# Patient Record
Sex: MALE | Race: BLACK OR AFRICAN AMERICAN | NOT HISPANIC OR LATINO | URBAN - METROPOLITAN AREA
[De-identification: names, ages, dates, MRNs, and addresses within clinical notes are randomized per-mention and may not be internally consistent; named-entity substitution may affect disease eponyms.]

---

## 2018-01-22 ENCOUNTER — INPATIENT (INPATIENT)
Facility: HOSPITAL | Age: 23
LOS: 1 days | Discharge: HOME | DRG: 203 | End: 2018-01-24
Attending: INTERNAL MEDICINE | Admitting: INTERNAL MEDICINE
Payer: COMMERCIAL

## 2018-01-22 VITALS
SYSTOLIC BLOOD PRESSURE: 120 MMHG | HEIGHT: 70 IN | OXYGEN SATURATION: 100 % | WEIGHT: 160.06 LBS | DIASTOLIC BLOOD PRESSURE: 58 MMHG | RESPIRATION RATE: 28 BRPM | TEMPERATURE: 98 F | HEART RATE: 122 BPM

## 2018-01-22 DIAGNOSIS — Z90.89 ACQUIRED ABSENCE OF OTHER ORGANS: Chronic | ICD-10-CM

## 2018-01-22 DIAGNOSIS — Z98.890 OTHER SPECIFIED POSTPROCEDURAL STATES: Chronic | ICD-10-CM

## 2018-01-22 DIAGNOSIS — J45.21 MILD INTERMITTENT ASTHMA WITH (ACUTE) EXACERBATION: ICD-10-CM

## 2018-01-22 DIAGNOSIS — J45.902 UNSPECIFIED ASTHMA WITH STATUS ASTHMATICUS: ICD-10-CM

## 2018-01-22 LAB
ALBUMIN SERPL ELPH-MCNC: 4.9 G/DL — SIGNIFICANT CHANGE UP (ref 3.3–5.2)
ALP SERPL-CCNC: 115 U/L — SIGNIFICANT CHANGE UP (ref 40–120)
ALT FLD-CCNC: 17 U/L — SIGNIFICANT CHANGE UP
ANION GAP SERPL CALC-SCNC: 20 MMOL/L — HIGH (ref 5–17)
AST SERPL-CCNC: 34 U/L — SIGNIFICANT CHANGE UP
BASOPHILS # BLD AUTO: 0 K/UL — SIGNIFICANT CHANGE UP (ref 0–0.2)
BILIRUB SERPL-MCNC: 0.9 MG/DL — SIGNIFICANT CHANGE UP (ref 0.4–2)
BUN SERPL-MCNC: 12 MG/DL — SIGNIFICANT CHANGE UP (ref 8–20)
CALCIUM SERPL-MCNC: 9.8 MG/DL — SIGNIFICANT CHANGE UP (ref 8.6–10.2)
CHLORIDE SERPL-SCNC: 100 MMOL/L — SIGNIFICANT CHANGE UP (ref 98–107)
CO2 SERPL-SCNC: 24 MMOL/L — SIGNIFICANT CHANGE UP (ref 22–29)
CREAT SERPL-MCNC: 0.81 MG/DL — SIGNIFICANT CHANGE UP (ref 0.5–1.3)
EOSINOPHIL # BLD AUTO: 1.1 K/UL — HIGH (ref 0–0.5)
EOSINOPHIL NFR BLD AUTO: 6 % — SIGNIFICANT CHANGE UP (ref 0–6)
GLUCOSE SERPL-MCNC: 130 MG/DL — HIGH (ref 70–115)
HCT VFR BLD CALC: 47.7 % — SIGNIFICANT CHANGE UP (ref 42–52)
HGB BLD-MCNC: 16.8 G/DL — SIGNIFICANT CHANGE UP (ref 14–18)
LYMPHOCYTES # BLD AUTO: 17 % — LOW (ref 20–55)
LYMPHOCYTES # BLD AUTO: 2.4 K/UL — SIGNIFICANT CHANGE UP (ref 1–4.8)
MCHC RBC-ENTMCNC: 28.2 PG — SIGNIFICANT CHANGE UP (ref 27–31)
MCHC RBC-ENTMCNC: 35.2 G/DL — SIGNIFICANT CHANGE UP (ref 32–36)
MCV RBC AUTO: 80.2 FL — SIGNIFICANT CHANGE UP (ref 80–94)
MONOCYTES # BLD AUTO: 1.2 K/UL — HIGH (ref 0–0.8)
MONOCYTES NFR BLD AUTO: 9 % — SIGNIFICANT CHANGE UP (ref 3–10)
NEUTROPHILS # BLD AUTO: 9.5 K/UL — HIGH (ref 1.8–8)
NEUTROPHILS NFR BLD AUTO: 66 % — SIGNIFICANT CHANGE UP (ref 37–73)
PLAT MORPH BLD: NORMAL — SIGNIFICANT CHANGE UP
PLATELET # BLD AUTO: 273 K/UL — SIGNIFICANT CHANGE UP (ref 150–400)
POTASSIUM SERPL-MCNC: 3.7 MMOL/L — SIGNIFICANT CHANGE UP (ref 3.5–5.3)
POTASSIUM SERPL-SCNC: 3.7 MMOL/L — SIGNIFICANT CHANGE UP (ref 3.5–5.3)
PROT SERPL-MCNC: 8.4 G/DL — SIGNIFICANT CHANGE UP (ref 6.6–8.7)
RBC # BLD: 5.95 M/UL — SIGNIFICANT CHANGE UP (ref 4.6–6.2)
RBC # FLD: 13.2 % — SIGNIFICANT CHANGE UP (ref 11–15.6)
RBC BLD AUTO: NORMAL — SIGNIFICANT CHANGE UP
SODIUM SERPL-SCNC: 144 MMOL/L — SIGNIFICANT CHANGE UP (ref 135–145)
VARIANT LYMPHS # BLD: 2 % — SIGNIFICANT CHANGE UP (ref 0–6)
WBC # BLD: 14.3 K/UL — HIGH (ref 4.8–10.8)
WBC # FLD AUTO: 14.3 K/UL — HIGH (ref 4.8–10.8)

## 2018-01-22 PROCEDURE — 99223 1ST HOSP IP/OBS HIGH 75: CPT | Mod: GC

## 2018-01-22 PROCEDURE — 71045 X-RAY EXAM CHEST 1 VIEW: CPT | Mod: 26

## 2018-01-22 PROCEDURE — 99285 EMERGENCY DEPT VISIT HI MDM: CPT

## 2018-01-22 RX ORDER — IPRATROPIUM/ALBUTEROL SULFATE 18-103MCG
3 AEROSOL WITH ADAPTER (GRAM) INHALATION EVERY 6 HOURS
Qty: 0 | Refills: 0 | Status: DISCONTINUED | OUTPATIENT
Start: 2018-01-22 | End: 2018-01-23

## 2018-01-22 RX ORDER — IPRATROPIUM/ALBUTEROL SULFATE 18-103MCG
3 AEROSOL WITH ADAPTER (GRAM) INHALATION
Qty: 0 | Refills: 0 | Status: COMPLETED | OUTPATIENT
Start: 2018-01-22 | End: 2018-01-22

## 2018-01-22 RX ORDER — ALBUTEROL 90 UG/1
2.5 AEROSOL, METERED ORAL EVERY 4 HOURS
Qty: 0 | Refills: 0 | Status: DISCONTINUED | OUTPATIENT
Start: 2018-01-22 | End: 2018-01-23

## 2018-01-22 RX ORDER — EPINEPHRINE 0.3 MG/.3ML
0.3 INJECTION INTRAMUSCULAR; SUBCUTANEOUS ONCE
Qty: 0 | Refills: 0 | Status: COMPLETED | OUTPATIENT
Start: 2018-01-22 | End: 2018-01-22

## 2018-01-22 RX ORDER — SODIUM CHLORIDE 9 MG/ML
3 INJECTION INTRAMUSCULAR; INTRAVENOUS; SUBCUTANEOUS EVERY 8 HOURS
Qty: 0 | Refills: 0 | Status: DISCONTINUED | OUTPATIENT
Start: 2018-01-22 | End: 2018-01-24

## 2018-01-22 RX ADMIN — EPINEPHRINE 0.3 MILLIGRAM(S): 0.3 INJECTION INTRAMUSCULAR; SUBCUTANEOUS at 12:06

## 2018-01-22 RX ADMIN — Medication 3 MILLILITER(S): at 12:21

## 2018-01-22 RX ADMIN — Medication 3 MILLILITER(S): at 14:00

## 2018-01-22 RX ADMIN — SODIUM CHLORIDE 3 MILLILITER(S): 9 INJECTION INTRAMUSCULAR; INTRAVENOUS; SUBCUTANEOUS at 20:50

## 2018-01-22 RX ADMIN — Medication 3 MILLILITER(S): at 13:12

## 2018-01-22 RX ADMIN — Medication 104 MILLIGRAM(S): at 20:08

## 2018-01-22 RX ADMIN — Medication 3 MILLILITER(S): at 20:08

## 2018-01-22 NOTE — H&P ADULT - NSHPSOCIALHISTORY_GEN_ALL_CORE
Patient is in between jobs.  He is , lives with his wife and one son. His son is sick with URI  He is a current smoker of cigarettes since age 14, approximately 5 cigarettes/day, he is thinking about quitting, not ready to quit yet.  Smokes marijuana approximately 2 times a month.  Drinks dark liquor, approximately once a week, 4-5 drinks.  No carpeting at home, no pets.

## 2018-01-22 NOTE — ED ADULT NURSE NOTE - OBJECTIVE STATEMENT
patient complaining of asthma exacerbation that started a week ago and has gotten progressively worse. Resp even inspiratory and expiratory wheezing noted. No productive cough. patient states he recently lost his job and he was depressed, has a son who helped him out of this depression.

## 2018-01-22 NOTE — ED ADULT TRIAGE NOTE - BP NONINVASIVE DIASTOLIC (MM HG)
Daughter calling for rx  Was not aware 48-72hr turnaround  Meagan Elliott pt needs today is in pain- see other 2/23 encounter 58

## 2018-01-22 NOTE — H&P ADULT - PROBLEM SELECTOR PLAN 1
-Admit to medical floor.  -Continuous pulse oximeter and supplemental O2 by nasal cannulae if Sat <92%  -IV Methylprednisolone at 2mg/Kg x1 then 1mg/Kg IVq6h.  -Duonebs q6h and Albuterol q4 hours PRN.  -RVP  -Pulmonary consult.  -Counseling about smoking cessation, will provide Nicotine patch if needed.

## 2018-01-22 NOTE — ED PROVIDER NOTE - OBJECTIVE STATEMENT
21 y/o M pt transported to ED c/o SOB and wheezing via EMS with hx of RAD. Per EMS pt was administered albuterol treatments x2, solumedrol, magnesium and epi PTA. He reports that he has been sick the last 2 weeks but the sxs progrssed over the last few days. No further complaints at this time.

## 2018-01-22 NOTE — H&P ADULT - ASSESSMENT
22 years old male patient with PMHx of asthma presenting today with progressively worsening shortness of breath, productive cough and chest pain. Given his medical history and sick contacts, likely asthma exacerbation secondary to URI.

## 2018-01-22 NOTE — H&P ADULT - NSHPLABSRESULTS_GEN_ALL_CORE
16.8   14.3  )-----------( 273      ( 22 Jan 2018 13:26 )             47.7   01-22    144  |  100  |  12.0  ----------------------------<  130<H>  3.7   |  24.0  |  0.81    Ca    9.8      22 Jan 2018 13:26  TPro  8.4  /  Alb  4.9  /  TBili  0.9  /  DBili  x   /  AST  34  /  ALT  17  /  AlkPhos  115  01-22    < from: Xray Chest 1 View AP-PORTABLE IMMEDIATE (01.22.18 @ 13:08) >  Findings:  The lungs are clear. The heart and mediastinum are unremarkable.  No hilar abnormality is seen.  There is no evidence of pleural effusion. The visualized osseous structures demonstrate no abnormality.    Impression:  Unremarkable exam.    MATTHEW PRIETO M.D., ATTENDING RADIOLOGIST  < end of copied text >    < from: 12 Lead ECG (01.22.18 @ 13:33) >  Ventricular Rate 104 BPM  Atrial Rate 104 BPM  P-R Interval 128 ms  QRS Duration 74 ms  Q-T Interval 376 ms  QTC Calculation(Bezet) 494 ms  P Axis 78 degrees  R Axis 74 degrees  T Axis 35 degrees    Diagnosis Line Sinus tachycardia  Right atrial enlargement  Borderline ECG  Confirmed by GENEVA MATHEWS ([id number]) on 1/22/2018 4:39:08 PM  < end of copied text >

## 2018-01-22 NOTE — SBIRT NOTE. - NSSBIRTSERVICES_GEN_A_ED_FT
Provided SBIRT services: Full screen positive. Brief Intervention Performed. Screening results were reviewed with the patient and patient was provided information about healthy guidelines and potential negative consequences associated with level of risk. Motivation and readiness to reduce or stop use was discussed and goals and activities to make changes were suggested/offered.  Audit Score: 6  DAST Score: 2  Duration = 20 Minutes

## 2018-01-22 NOTE — H&P ADULT - HISTORY OF PRESENT ILLNESS
Patient is a 22 years old male with prior medical history of asthma who was brought in by ambulance to the ED of this establishment with approximately 1 week of cough productive of yellow sputum, dyspnea that progressed until being present at rest, chest tightness and substernal sharp pain 7/10 at its worst associated with coughing for which he was administering nebulized albuterol at home without improvement. EMS administered x2 nebulizations with Salbutamol, IV Methylprednisolone and Magnesium and Epinephrine. Upon arrival at the ED, as per ED physician, patient was found tachycardic in the 122bpm, tachypneic at 28rpm and in moderate respiratory distress with use of accessory muscles, he received Duonebs q20 minutes x3 and one dose of 0.3 mg of epinephrine IM after which he reports improvement of his symptoms.   As per patient he uses nebulized albuterol PRN, no use of inhaled corticosteroids or other medications, he reports asthma symptoms < once a month, nighttime symptoms approximately once a month, his last hospitalization due to asthma was 5 years ago, and he has had 2 visits to the ED due to asthma in the last 12 months, he states that at age 2 he was admitted to the ICU and intubated due to asthma as well.

## 2018-01-22 NOTE — ED ADULT NURSE REASSESSMENT NOTE - GENERAL PATIENT STATE
cooperative/comfortable appearance/resting/sleeping
improvement verbalized/smiling/interactive/comfortable appearance/cooperative

## 2018-01-22 NOTE — ED ADULT NURSE REASSESSMENT NOTE - NS ED NURSE REASSESS COMMENT FT1
Pt A&OX3, resting comfortably, watching TV.  Pt states he feels much better than when he came into ED.  Pt still wheezing bilat.  Pt states he still feels chest tightness no pain.
Pt still wheezing bilat.  Pt states he feels better than when he came into ED.  NAD.  Pt was sleeping, easily arousable.  Pt still c/o of chest discomfort when he takes a deep breath and coughs.
Assumed pt care @ 1930 from CHANDNI Vance. Pt is A&Ox3 in NAD, SOB with exertion. Pt denies complaint.  IV clean dry and intact, flushes without difficulty. Pt OOB independently. Safety maintained, Bed locked in lowest position. Pt aware of the plan of care. Pt awaiting bed placement. Will continue to monitor.

## 2018-01-22 NOTE — H&P ADULT - NSHPPHYSICALEXAM_GEN_ALL_CORE
Vital Signs Last 24 Hrs  T(C): 36.7 (22 Jan 2018 11:49), Max: 36.7 (22 Jan 2018 11:49)  T(F): 98.1 (22 Jan 2018 11:49), Max: 98.1 (22 Jan 2018 11:49)  HR: 122 (22 Jan 2018 11:49) (122 - 122)  BP: 120/58 (22 Jan 2018 11:49) (120/58 - 120/58)  RR: 28 (22 Jan 2018 11:49) (28 - 28)  SpO2: 100% (22 Jan 2018 11:49) (100% - 100%)    General: Patient found resting in bed, in no acute distress.  HEENT: Head is normocephalic, atraumatic, Eyes: EOMI, clear conjunctivae and sclerae, Nose: No nasal discharge. Ears: there is impacted cerumen partially obstructing ear canal bilaterally, tympanic membrane is pearly bilaterally, no bulging, no perforation. Throat: Absence of tonsils is noted, no pharyngeal erythema or exudates.  Neck: Supple  Respiratory: Tachypneic, no use of accessory muscles, inspiratory and expiratory diffuse bilateral wheezing, no crackles, moderate air movement.  Cardiac: Regular rate and rhythm, soft S1 and S2, no murmurs, rubs or gallops.  GI: Bowel sounds present in all quadrants, abdomen is flat, soft, nontender, nondistended.  Extremities: No cyanosis or edema, capillary refill <2 seconds.  Neuro: Alert and oriented x3, no gross deficits.  Psych: Good eye contact, normal speech and affect.

## 2018-01-22 NOTE — ED ADULT TRIAGE NOTE - CHIEF COMPLAINT QUOTE
pt BIBA from clinic for asthma attack. pt AOX3, speaking in full sentences. 20g to right forearm, given epi, mag and solumedrol by  EMS. total 2 albuterol given PTA. states feeling SOB x few days. pt tachypneic with labored breathing on arrival. ER MD at bedside for eval. history of intubation.

## 2018-01-23 ENCOUNTER — TRANSCRIPTION ENCOUNTER (OUTPATIENT)
Age: 23
End: 2018-01-23

## 2018-01-23 LAB
ANION GAP SERPL CALC-SCNC: 17 MMOL/L — SIGNIFICANT CHANGE UP (ref 5–17)
BASOPHILS # BLD AUTO: 0 K/UL — SIGNIFICANT CHANGE UP (ref 0–0.2)
BASOPHILS NFR BLD AUTO: 0.1 % — SIGNIFICANT CHANGE UP (ref 0–2)
BUN SERPL-MCNC: 15 MG/DL — SIGNIFICANT CHANGE UP (ref 8–20)
CALCIUM SERPL-MCNC: 9.8 MG/DL — SIGNIFICANT CHANGE UP (ref 8.6–10.2)
CHLORIDE SERPL-SCNC: 100 MMOL/L — SIGNIFICANT CHANGE UP (ref 98–107)
CO2 SERPL-SCNC: 21 MMOL/L — LOW (ref 22–29)
CREAT SERPL-MCNC: 0.86 MG/DL — SIGNIFICANT CHANGE UP (ref 0.5–1.3)
EOSINOPHIL # BLD AUTO: 0 K/UL — SIGNIFICANT CHANGE UP (ref 0–0.5)
EOSINOPHIL NFR BLD AUTO: 0.1 % — SIGNIFICANT CHANGE UP (ref 0–5)
GLUCOSE SERPL-MCNC: 136 MG/DL — HIGH (ref 70–115)
GRAM STN FLD: SIGNIFICANT CHANGE UP
HCT VFR BLD CALC: 44.4 % — SIGNIFICANT CHANGE UP (ref 42–52)
HGB BLD-MCNC: 15.8 G/DL — SIGNIFICANT CHANGE UP (ref 14–18)
LYMPHOCYTES # BLD AUTO: 1.6 K/UL — SIGNIFICANT CHANGE UP (ref 1–4.8)
LYMPHOCYTES # BLD AUTO: 10.3 % — LOW (ref 20–55)
MCHC RBC-ENTMCNC: 28.1 PG — SIGNIFICANT CHANGE UP (ref 27–31)
MCHC RBC-ENTMCNC: 35.6 G/DL — SIGNIFICANT CHANGE UP (ref 32–36)
MCV RBC AUTO: 79 FL — LOW (ref 80–94)
MONOCYTES # BLD AUTO: 1.1 K/UL — HIGH (ref 0–0.8)
MONOCYTES NFR BLD AUTO: 7.2 % — SIGNIFICANT CHANGE UP (ref 3–10)
NEUTROPHILS # BLD AUTO: 12.5 K/UL — HIGH (ref 1.8–8)
NEUTROPHILS NFR BLD AUTO: 82.1 % — HIGH (ref 37–73)
PLATELET # BLD AUTO: 241 K/UL — SIGNIFICANT CHANGE UP (ref 150–400)
POTASSIUM SERPL-MCNC: 4.1 MMOL/L — SIGNIFICANT CHANGE UP (ref 3.5–5.3)
POTASSIUM SERPL-SCNC: 4.1 MMOL/L — SIGNIFICANT CHANGE UP (ref 3.5–5.3)
RAPID RVP RESULT: SIGNIFICANT CHANGE UP
RBC # BLD: 5.62 M/UL — SIGNIFICANT CHANGE UP (ref 4.6–6.2)
RBC # FLD: 13.3 % — SIGNIFICANT CHANGE UP (ref 11–15.6)
SODIUM SERPL-SCNC: 138 MMOL/L — SIGNIFICANT CHANGE UP (ref 135–145)
SPECIMEN SOURCE: SIGNIFICANT CHANGE UP
WBC # BLD: 15.2 K/UL — HIGH (ref 4.8–10.8)
WBC # FLD AUTO: 15.2 K/UL — HIGH (ref 4.8–10.8)

## 2018-01-23 PROCEDURE — 99223 1ST HOSP IP/OBS HIGH 75: CPT

## 2018-01-23 PROCEDURE — 99233 SBSQ HOSP IP/OBS HIGH 50: CPT | Mod: GC

## 2018-01-23 RX ORDER — ALBUTEROL 90 UG/1
2 AEROSOL, METERED ORAL
Qty: 1 | Refills: 0 | OUTPATIENT
Start: 2018-01-23 | End: 2018-02-21

## 2018-01-23 RX ORDER — FLUTICASONE PROPIONATE 50 MCG
1 SPRAY, SUSPENSION NASAL
Qty: 0 | Refills: 0 | COMMUNITY
Start: 2018-01-23

## 2018-01-23 RX ORDER — ALBUTEROL 90 UG/1
2.5 AEROSOL, METERED ORAL
Qty: 0 | Refills: 0 | Status: DISCONTINUED | OUTPATIENT
Start: 2018-01-23 | End: 2018-01-23

## 2018-01-23 RX ORDER — IPRATROPIUM/ALBUTEROL SULFATE 18-103MCG
3 AEROSOL WITH ADAPTER (GRAM) INHALATION EVERY 4 HOURS
Qty: 0 | Refills: 0 | Status: DISCONTINUED | OUTPATIENT
Start: 2018-01-23 | End: 2018-01-23

## 2018-01-23 RX ORDER — IPRATROPIUM/ALBUTEROL SULFATE 18-103MCG
3 AEROSOL WITH ADAPTER (GRAM) INHALATION EVERY 6 HOURS
Qty: 0 | Refills: 0 | Status: DISCONTINUED | OUTPATIENT
Start: 2018-01-23 | End: 2018-01-24

## 2018-01-23 RX ORDER — ALBUTEROL 90 UG/1
2.5 AEROSOL, METERED ORAL EVERY 4 HOURS
Qty: 0 | Refills: 0 | Status: DISCONTINUED | OUTPATIENT
Start: 2018-01-23 | End: 2018-01-24

## 2018-01-23 RX ORDER — FLUTICASONE PROPIONATE 50 MCG
1 SPRAY, SUSPENSION NASAL
Qty: 0 | Refills: 0 | Status: DISCONTINUED | OUTPATIENT
Start: 2018-01-23 | End: 2018-01-24

## 2018-01-23 RX ADMIN — Medication 3 MILLILITER(S): at 07:24

## 2018-01-23 RX ADMIN — Medication 102 MILLIGRAM(S): at 06:53

## 2018-01-23 RX ADMIN — Medication 40 MILLIGRAM(S): at 18:08

## 2018-01-23 RX ADMIN — Medication 3 MILLILITER(S): at 07:30

## 2018-01-23 RX ADMIN — Medication 102 MILLIGRAM(S): at 00:13

## 2018-01-23 RX ADMIN — Medication 3 MILLILITER(S): at 20:36

## 2018-01-23 RX ADMIN — SODIUM CHLORIDE 3 MILLILITER(S): 9 INJECTION INTRAMUSCULAR; INTRAVENOUS; SUBCUTANEOUS at 06:52

## 2018-01-23 RX ADMIN — SODIUM CHLORIDE 3 MILLILITER(S): 9 INJECTION INTRAMUSCULAR; INTRAVENOUS; SUBCUTANEOUS at 13:44

## 2018-01-23 RX ADMIN — Medication 1 SPRAY(S): at 18:08

## 2018-01-23 RX ADMIN — SODIUM CHLORIDE 3 MILLILITER(S): 9 INJECTION INTRAMUSCULAR; INTRAVENOUS; SUBCUTANEOUS at 22:30

## 2018-01-23 RX ADMIN — Medication 3 MILLILITER(S): at 15:44

## 2018-01-23 RX ADMIN — Medication 3 MILLILITER(S): at 12:28

## 2018-01-23 RX ADMIN — Medication 3 MILLILITER(S): at 03:12

## 2018-01-23 NOTE — DISCHARGE NOTE ADULT - PROVIDER TOKENS
TOKEN:'[id number]',FREE:[LAST:[Louis],FIRST:[[place] SAAVEDRA); Family Medicine],PHONE:[phone]],FAX:[phone]],ADDRESS:[[facility] [place] [address]]]

## 2018-01-23 NOTE — DISCHARGE NOTE ADULT - PLAN OF CARE
Prevention of exacerbations. After discharge from the hospital you will have a albuterol spray sent to your pharmacy, it's important to have the spray in case you have symptoms outside of home. You will have to continue follow up with your primary care doctor and with a lung doctor for management of your asthma, additional testing may be warranted. Quitting smoking is one of the most important things you can do to decrease the frequency of asthma exacerbation. Your asthma can also make you more likely to suffer complications from certain diseases that are preventable with vaccines, discuss with your primary care doctor the benefits of getting your yearly flu vaccine and pneumococcal vaccine. After discharge from the hospital you will have a albuterol spray sent to your pharmacy, it's important to have the spray in case you have symptoms outside of home. You will have to continue follow up with your primary care doctor and with a lung doctor for management of your asthma, additional testing may be warranted. Quitting smoking is one of the most important things you can do to decrease the frequency of asthma exacerbation. Your asthma can also make you more likely to suffer complications from certain diseases that are preventable with vaccines, discuss with your primary care doctor the benefits of getting your yearly flu vaccine and pneumococcal vaccine.   Make sure that once the exacerbation has resolved, you measure your peak flow to know your baseline and whenever you have an exacerbation you will be able to estimate its severity. After discharge from the hospital you will have a albuterol spray sent to your pharmacy, it's important to have the spray in case you have symptoms outside of home. You will have to continue follow up with your primary care doctor and with a lung doctor for management of your asthma, additional testing may be warranted. Quitting smoking is one of the most important things you can do to decrease the frequency of asthma exacerbation. Your asthma can also make you more likely to suffer complications from certain diseases that are preventable with vaccines, discuss with your primary care doctor the benefits of getting your yearly flu vaccine and pneumococcal vaccine.   Make sure that once the exacerbation has resolved, you measure your peak flow to know your baseline and whenever you have an exacerbation you will be able to estimate its severity.  Be sure to follow up with Pulmonary, you should discuss having them check your IgE blood levels and testing you for allergies which can make asthma worse. ALWAYS CARRY YOUR RESCUE HFA INHALER! You can use it every 4 hrs as needed when you have symptoms.

## 2018-01-23 NOTE — CONSULT NOTE ADULT - ASSESSMENT
-Severe asthma with A/E. Poor control at baseline. Noncompliance with meds.  -Wheeze  -Cough  -JARA  -Allergic rhinitis  -Hx of question of noncompaction of LV.    RECC:  IV steroids. Nebs. Needs controller meds upon d/c and close pulmonary f/u. Would do IgE level when off steroids. Consider allergy re-testing. Consider cardiology re-eval.

## 2018-01-23 NOTE — DISCHARGE NOTE ADULT - OTHER SIGNIFICANT FINDINGS
15.8   15.2  )-----------( 241      ( 23 Jan 2018 08:42 )             44.4   01-23    138  |  100  |  15.0  ----------------------------<  136<H>  4.1   |  21.0<L>  |  0.86    Ca    9.8      23 Jan 2018 08:42    TPro  8.4  /  Alb  4.9  /  TBili  0.9  /  DBili  x   /  AST  34  /  ALT  17  /  AlkPhos  115  01-22    Rapid RVP Result: NotDetec. (01.23.18 @ 05:42)  < from: Xray Chest 1 View AP-PORTABLE IMMEDIATE (01.22.18 @ 13:08) >  Findings:The lungs are clear. The heart and mediastinum are unremarkable.  No hilar abnormality is seen.  There is no evidence of pleural effusion. The visualized osseous structures demonstrate no abnormality.    Impression:  Unremarkable exam.    MATTHEW PRIETO M.D., ATTENDING RADIOLOGIST  < end of copied text >

## 2018-01-23 NOTE — DISCHARGE NOTE ADULT - CARE PLAN
Principal Discharge DX:	Exacerbation of intermittent asthma, unspecified asthma severity  Goal:	Prevention of exacerbations.  Assessment and plan of treatment:	After discharge from the hospital you will have a albuterol spray sent to your pharmacy, it's important to have the spray in case you have symptoms outside of home. You will have to continue follow up with your primary care doctor and with a lung doctor for management of your asthma, additional testing may be warranted. Quitting smoking is one of the most important things you can do to decrease the frequency of asthma exacerbation. Your asthma can also make you more likely to suffer complications from certain diseases that are preventable with vaccines, discuss with your primary care doctor the benefits of getting your yearly flu vaccine and pneumococcal vaccine. Principal Discharge DX:	Exacerbation of intermittent asthma, unspecified asthma severity  Goal:	Prevention of exacerbations.  Assessment and plan of treatment:	After discharge from the hospital you will have a albuterol spray sent to your pharmacy, it's important to have the spray in case you have symptoms outside of home. You will have to continue follow up with your primary care doctor and with a lung doctor for management of your asthma, additional testing may be warranted. Quitting smoking is one of the most important things you can do to decrease the frequency of asthma exacerbation. Your asthma can also make you more likely to suffer complications from certain diseases that are preventable with vaccines, discuss with your primary care doctor the benefits of getting your yearly flu vaccine and pneumococcal vaccine.   Make sure that once the exacerbation has resolved, you measure your peak flow to know your baseline and whenever you have an exacerbation you will be able to estimate its severity. Principal Discharge DX:	Exacerbation of intermittent asthma, unspecified asthma severity  Goal:	Prevention of exacerbations.  Assessment and plan of treatment:	After discharge from the hospital you will have a albuterol spray sent to your pharmacy, it's important to have the spray in case you have symptoms outside of home. You will have to continue follow up with your primary care doctor and with a lung doctor for management of your asthma, additional testing may be warranted. Quitting smoking is one of the most important things you can do to decrease the frequency of asthma exacerbation. Your asthma can also make you more likely to suffer complications from certain diseases that are preventable with vaccines, discuss with your primary care doctor the benefits of getting your yearly flu vaccine and pneumococcal vaccine.   Make sure that once the exacerbation has resolved, you measure your peak flow to know your baseline and whenever you have an exacerbation you will be able to estimate its severity.  Be sure to follow up with Pulmonary, you should discuss having them check your IgE blood levels and testing you for allergies which can make asthma worse. ALWAYS CARRY YOUR RESCUE HFA INHALER! You can use it every 4 hrs as needed when you have symptoms.

## 2018-01-23 NOTE — CONSULT NOTE ADULT - SUBJECTIVE AND OBJECTIVE BOX
PULMONARY CONSULT NOTE      LAKESHA BRIGHTN-[id number]    Patient is a 22y old  Male who presents with a chief complaint of Shortness of breath (22 Jan 2018 22:13)      HISTORY OF PRESENT ILLNESS: He reports a hx of asthma since being a young child. Multiple hospitalizations; most of them at [facility] in [place]. Has had trouble with compliance with meds. He says he feels like his asthma has been controlled until about last year. For the past year more episodes of tightness, sob. Still not on controller medication; albuterol prn but he r/o of his MDI and is using only nebs now. He has seen an allergist in the past. Also had w/u by a cardiologist for noncompaction of LV; second opinion felt that not to be the case. He presents with several days of increasing chest tightness, sob, wheeze and productive cough. No fever, no hemoptysis. Not responding to albuterol. Feeling much better today.     MEDICATIONS  (STANDING):  ALBUTerol/ipratropium for Nebulization 3 milliLiter(s) Nebulizer every 4 hours  methylPREDNISolone sodium succinate IVPB 40 milliGRAM(s) IV Intermittent every 6 hours  sodium chloride 0.9% lock flush 3 milliLiter(s) IV Push every 8 hours      MEDICATIONS  (PRN):  ALBUTerol    0.083% 2.5 milliGRAM(s) Nebulizer every 2 hours PRN Shortness of Breath      Allergies    No Known Drug Allergies  peanuts (Other)    Intolerances        PAST MEDICAL & SURGICAL HISTORY:  Asthma  S/P wrist surgery: right wrist at age 13.  S/P tonsillectomy and adenoidectomy: at age 9      FAMILY HISTORY:  No pertinent family history in first degree relatives      SOCIAL HISTORY  Smoking History: nonsmoker    REVIEW OF SYSTEMS:    CONSTITUTIONAL:  per HPI    HEENT:  Eyes:  No diplopia or blurred vision. ENT:  No earache, sore throat or runny nose.    CARDIOVASCULAR:  per HPI    RESPIRATORY: per HPI      GASTROINTESTINAL:  No abdominal pain, nausea, vomiting or diarrhea.    GENITOURINARY:  No dysuria, frequency or urgency.    NEUROLOGIC:  No paresthesias, fasciculations, seizures or weakness.    PSYCHIATRIC:  No disorder of thought or mood.    Vital Signs Last 24 Hrs  T(C): 36.6 (23 Jan 2018 08:36), Max: 37 (22 Jan 2018 21:17)  T(F): 97.9 (23 Jan 2018 08:36), Max: 98.6 (22 Jan 2018 21:17)  HR: 81 (23 Jan 2018 08:36) (70 - 122)  BP: 117/62 (23 Jan 2018 08:36) (117/62 - 129/70)  BP(mean): --  RR: 18 (23 Jan 2018 08:36) (18 - 28)  SpO2: 93% (23 Jan 2018 08:36) (93% - 100%)    PHYSICAL EXAMINATION:    GENERAL: The patient is a well-developed in no apparent distress.     HEENT: Head is normocephalic and atraumatic. Extraocular muscles are intact. Mucous membranes are moist.     NECK: Supple.     LUNGS: Diffuse b/l wheeze, crackles, good air entry, respirations unlabored    HEART: Regular rate and rhythm without murmur.    ABDOMEN: Soft, nontender, and nondistended.  No hepatosplenomegaly is noted.    EXTREMITIES: Without any cyanosis, clubbing, rash, lesions or edema.    NEUROLOGIC: Grossly intact.      LABS:                        15.8   15.2  )-----------( 241      ( 23 Jan 2018 08:42 )             44.4     01-23    138  |  100  |  15.0  ----------------------------<  136<H>  4.1   |  21.0<L>  |  0.86    Ca    9.8      23 Jan 2018 08:42    TPro  8.4  /  Alb  4.9  /  TBili  0.9  /  DBili  x   /  AST  34  /  ALT  17  /  AlkPhos  115  01-22    Culture - Sputum . (01.23.18 @ 06:49)    Gram Stain:   No White blood cells  Few Gram positive cocci in pairs  Few Gram Positive Rods    Specimen Source: .Sputum Sputum    Rapid Respiratory Viral Panel (01.23.18 @ 05:42)    Rapid RVP Result: NotDetec: The [employer] RVP Rapid uses polymerase chain reaction (PCR) and melt  curve analysis to screen for adenovirus; coronavirus HKU1, NL63, 229E,  OC43; human metapneumovirus (hMPV); human enterovirus/rhinovirus  (Entero/RV); influenza A; influenza A/H1;influenza A/H3; influenza  A/H1-2009; influenza B; parainfluenza viruses 1, 2, 3, 4; respiratory  syncytial virus; Bordetella pertussis; Mycoplasma pneumoniae; and  Chlamydophila pneumoniae.             RADIOLOGY & ADDITIONAL STUDIES:  < from: Xray Chest 1 View AP-PORTABLE IMMEDIATE (01.22.18 @ 13:08) >   EXAM:  XR CHEST PORTABLE IMMED 1V                          PROCEDURE DATE:  01/22/2018          INTERPRETATION:  CHEST AP PORTABLE:    History: Shortness of breath.    Date and time of exam: 1/22/2018 1:02 PM.    Comparison: No prior exam.    Technique: A single AP view of the chest was obtained.    Findings:  The lungs are clear. The heart and mediastinum are unremarkable.  No   hilar abnormality is seen.  There is no evidence of pleural effusion. The   visualized osseous structures demonstrate no abnormality.    Impression:    Unremarkable exam.                  MATTHEW PRIETO M.D., ATTENDING RADIOLOGIST    < end of copied text >

## 2018-01-23 NOTE — DISCHARGE NOTE ADULT - PATIENT PORTAL LINK FT
“You can access the [employer] Patient Portal, offered by [facility], by registering with the following website: [email or web address]”

## 2018-01-23 NOTE — PROGRESS NOTE ADULT - SUBJECTIVE AND OBJECTIVE BOX
Patient is a 22 years old male who presented with a chief complaint of shortness of breath and cough.    Patient was admitted with asthma exacerbation, he has not needed supplemental O2 to maintain O2 saturation above 92 overnight, he states that his cough still persists, his shortness of breath and his chest tightness have however improved. He denies any fever, nausea or vomiting. He states that he is tolerating PO intake of regular diet.    Vital Signs Last 24 Hrs  T(C): 36.7 (22 Jan 2018 23:40), Max: 37 (22 Jan 2018 21:17)  T(F): 98 (22 Jan 2018 23:40), Max: 98.6 (22 Jan 2018 21:17)  HR: 70 (22 Jan 2018 23:40) (70 - 122)  BP: 119/65 (22 Jan 2018 23:40) (119/65 - 129/70)  BP(mean): --  RR: 18 (22 Jan 2018 23:40) (18 - 28)  SpO2: 95% (22 Jan 2018 23:40) (94% - 100%)    General: Patient found resting in bed, in no acute distress.  HEENT: Head is normocephalic, atraumatic, Eyes: EOMI, clear conjunctivae and sclerae, Nose: No nasal discharge. Ears: there is impacted cerumen partially obstructing ear canal bilaterally, tympanic membrane is pearly bilaterally, no bulging, no perforation. Throat: Absence of tonsils is noted, no pharyngeal erythema or exudates.  Neck: Supple  Respiratory: Tachypneic, no use of accessory muscles, inspiratory and expiratory diffuse bilateral wheezing, no crackles, moderate air movement.  Cardiac: Regular rate and rhythm, soft S1 and S2, no murmurs, rubs or gallops.  GI: Bowel sounds present in all quadrants, abdomen is flat, soft, nontender, nondistended.  Extremities: No cyanosis or edema, capillary refill <2 seconds.  Neuro: Alert and oriented x3, no gross deficits.    MEDICATIONS  (STANDING):  ALBUTerol/ipratropium for Nebulization 3 milliLiter(s) Nebulizer every 4 hours  methylPREDNISolone sodium succinate IVPB 40 milliGRAM(s) IV Intermittent every 6 hours  sodium chloride 0.9% lock flush 3 milliLiter(s) IV Push every 8 hours    MEDICATIONS  (PRN):  ALBUTerol    0.083% 2.5 milliGRAM(s) Nebulizer every 2 hours PRN Shortness of Breath Patient is a 22 years old male who presented with a chief complaint of shortness of breath and cough.    Patient was admitted with asthma exacerbation, he has not needed supplemental O2 to maintain O2 saturation above 92 overnight, he states that his cough still persists, his shortness of breath and his chest tightness have however improved. He denies any fever, nausea or vomiting. He states that he is tolerating PO intake of regular diet.    Vital Signs Last 24 Hrs  T(C): 36.6 (23 Jan 2018 15:36), Max: 37 (22 Jan 2018 21:17)  T(F): 97.9 (23 Jan 2018 15:36), Max: 98.6 (22 Jan 2018 21:17)  HR: 72 (23 Jan 2018 15:36) (70 - 93)  BP: 123/70 (23 Jan 2018 15:36) (117/62 - 129/70)  BP(mean): --  RR: 18 (23 Jan 2018 15:36) (18 - 20)  SpO2: 95% (23 Jan 2018 15:44) (93% - 98%)    General: Patient found resting in bed, in no acute distress.  HEENT: Head is normocephalic, atraumatic, Eyes: EOMI, clear conjunctivae and sclerae, Nose: No nasal discharge. Ears: there is impacted cerumen partially obstructing ear canal bilaterally, tympanic membrane is pearly bilaterally, no bulging, no perforation. Throat: Absence of tonsils is noted, no pharyngeal erythema or exudates.  Neck: Supple  Respiratory: Tachypneic, no use of accessory muscles, inspiratory and expiratory diffuse bilateral wheezing, no crackles, moderate air movement.  Cardiac: Regular rate and rhythm, soft S1 and S2, no murmurs, rubs or gallops.  GI: Bowel sounds present in all quadrants, abdomen is flat, soft, nontender, nondistended.  Extremities: No cyanosis or edema, capillary refill <2 seconds.  Neuro: Alert and oriented x3, no gross deficits.    MEDICATIONS  (STANDING):  ALBUTerol/ipratropium for Nebulization 3 milliLiter(s) Nebulizer every 4 hours  methylPREDNISolone sodium succinate IVPB 40 milliGRAM(s) IV Intermittent every 6 hours  sodium chloride 0.9% lock flush 3 milliLiter(s) IV Push every 8 hours    MEDICATIONS  (PRN):  ALBUTerol    0.083% 2.5 milliGRAM(s) Nebulizer every 2 hours PRN Shortness of Breath Patient is a 22 years old male who presented with a chief complaint of shortness of breath and cough.    Patient was admitted with asthma exacerbation, he has not needed supplemental O2 to maintain O2 saturation above 92 overnight, he states that his cough still persists, his shortness of breath and his chest tightness have however improved. He denies any fever, nausea or vomiting. He states that he is tolerating PO intake of regular diet.    Vital Signs Last 24 Hrs  T(C): 36.6 (23 Jan 2018 15:36), Max: 37 (22 Jan 2018 21:17)  T(F): 97.9 (23 Jan 2018 15:36), Max: 98.6 (22 Jan 2018 21:17)  HR: 72 (23 Jan 2018 15:36) (70 - 93)  BP: 123/70 (23 Jan 2018 15:36) (117/62 - 129/70)  BP(mean): --  RR: 18 (23 Jan 2018 15:36) (18 - 20)  SpO2: 95% (23 Jan 2018 15:44) (93% - 98%)    General: Patient found resting in bed, in no acute distress.  HEENT: Head is normocephalic, atraumatic, Eyes: EOMI, clear conjunctivae and sclerae, Nose: No nasal discharge. Ears: there is impacted cerumen partially obstructing ear canal bilaterally, tympanic membrane is pearly bilaterally, no bulging, no perforation. Throat: Absence of tonsils is noted, no pharyngeal erythema or exudates.  Neck: Supple  Respiratory: Eupneic, no use of accessory muscles, expiratory diffuse bilateral wheezing, no crackles, moderate air movement.  Cardiac: Regular rate and rhythm, soft S1 and S2, no murmurs, rubs or gallops.  GI: Bowel sounds present in all quadrants, abdomen is flat, soft, nontender, nondistended.  Extremities: No cyanosis or edema, capillary refill <2 seconds.  Neuro: Alert and oriented x3, no gross deficits.    MEDICATIONS  (STANDING):  ALBUTerol/ipratropium for Nebulization 3 milliLiter(s) Nebulizer every 4 hours  methylPREDNISolone sodium succinate IVPB 40 milliGRAM(s) IV Intermittent every 6 hours  sodium chloride 0.9% lock flush 3 milliLiter(s) IV Push every 8 hours    MEDICATIONS  (PRN):  ALBUTerol    0.083% 2.5 milliGRAM(s) Nebulizer every 2 hours PRN Shortness of Breath

## 2018-01-23 NOTE — DISCHARGE NOTE ADULT - HOSPITAL COURSE
Patient is a 22 years old male with PMHx of asthma who was brought by ambulance to the ED of this establishment with approximately 1 week of cough productive of yellow sputum, dyspnea that progressed until being present at rest, chest tightness and substernal sharp chest pain 7/10 at its worst associated with coughing, no other symptoms. He self-medicated with nebulized albuterol without improvement. EMS administered x2 Salbutamol nebulizations, IV methylprednisolone, Magnesium and Epinephrine, upon arrival at the ED, patient was found tachycardic in the 122bpm, tachypneic with 28rpm, in moderate respiratory distress with use of accessory muscles, he received Duonebs q20min x3 and one dose of epinephrine IM with improvement of his symptoms. CXR reported unremarkable findings and Respiratory viral panel was negative. At admission, patient was found with inspiratory and expiratory diffuse wheezing with no crackles and moderate air movement. He was started on IV methylprednisolone 125mg x1 and 40mg q6h afterwards and Duonebs [id number], patient remained afebrile during his hospital stay, with improvement of his shortness of breath and wheezing-free for >24hours at the time of discharge. To be followed by pulmonology and his primary care doctor. Smoking cessation encouraged and strategies discussed. Patient is a 22 years old male with PMHx of asthma who was brought by ambulance to the ED of this establishment with approximately 1 week of cough productive of yellow sputum, dyspnea that progressed until being present at rest, chest tightness and substernal sharp chest pain 7/10 at its worst associated with coughing, no other symptoms. He self-medicated with nebulized albuterol without improvement. EMS administered x2 Salbutamol nebulizations, IV methylprednisolone, Magnesium and Epinephrine, upon arrival at the ED, patient was found tachycardic in the 122bpm, tachypneic with 28rpm, in moderate respiratory distress with use of accessory muscles, he received Duonebs q20min x3 and one dose of epinephrine IM with improvement of his symptoms. CXR reported unremarkable findings and Respiratory viral panel was negative. At admission, patient was found with inspiratory and expiratory diffuse wheezing with no crackles and moderate air movement. He was started on IV methylprednisolone 125mg x1 and 40mg q6h afterwards and Duonebs [id number], patient remained afebrile during his hospital stay, with improvement of his shortness of breath and wheezing-free for >24hours at the time of discharge. To be followed by pulmonology and his primary care doctor. Smoking cessation encouraged and strategies discussed.    All electrolyte abnormalities were monitored carefully and repleted as necessary during this hospitalization. At the time of discharge patient was hemodynamically stable and amenable to all terms of discharge. The patient has received verbal instructions from myself regarding discharge plans.     Length of Discharge: 45MIN

## 2018-01-23 NOTE — DISCHARGE NOTE ADULT - MEDICATION SUMMARY - MEDICATIONS TO TAKE
I will START or STAY ON the medications listed below when I get home from the hospital:    predniSONE 10 mg oral tablet  -- 6 tab(s) by mouth once a day   -- It is very important that you take or use this exactly as directed.  Do not skip doses or discontinue unless directed by your doctor.  Obtain medical advice before taking any non-prescription drugs as some may affect the action of this medication.  Take with food or milk.    -- Indication: For Asthma exacerbation    predniSONE 10 mg oral tablet  -- 4 tab(s) by mouth once a day   -- It is very important that you take or use this exactly as directed.  Do not skip doses or discontinue unless directed by your doctor.  Obtain medical advice before taking any non-prescription drugs as some may affect the action of this medication.  Take with food or milk.    -- Indication: For Asthma exacerbation    predniSONE 10 mg oral tablet  -- 2 tab(s) by mouth once a day   -- It is very important that you take or use this exactly as directed.  Do not skip doses or discontinue unless directed by your doctor.  Obtain medical advice before taking any non-prescription drugs as some may affect the action of this medication.  Take with food or milk.    -- Indication: For Asthma exacerbation    predniSONE 10 mg oral tablet  -- 1 tab(s) by mouth once a day   -- It is very important that you take or use this exactly as directed.  Do not skip doses or discontinue unless directed by your doctor.  Obtain medical advice before taking any non-prescription drugs as some may affect the action of this medication.  Take with food or milk.    -- Indication: For Asthma exacerbation    albuterol 2.5 mg/3 mL (0.083%) inhalation solution  -- 3 milliliter(s) inhaled every 6 hours, As Needed  -- Indication: For Asthma    albuterol 90 mcg/inh inhalation aerosol  -- 2 puff(s) inhaled 4 times a day  -- Indication: For Asthma    fluticasone 50 mcg/inh nasal spray  -- 1 spray(s) into nose 2 times a day  -- Indication: For Rhinitis

## 2018-01-23 NOTE — PROGRESS NOTE ADULT - ATTENDING COMMENTS
Patient seen and examined at the bedside. Agree with the above history, physical, assessment, and plan with the necessary amendments/elaborations below:    Greatly improved from prior exam. Cont steroids, will taper. Cont resp tx. Again explained asthma therapy at length @ bedside including asthma action plan, importance of carrying HFA at all times, using peak flow. If still well tomorrow and not on supplemental o2 as he has not been thus far in hospitalization, will consider dc home tomorrow with taper of steroids + pulm fu in office.

## 2018-01-23 NOTE — PROGRESS NOTE ADULT - PROBLEM SELECTOR PLAN 1
-Continuous pulse oximeter and supplemental O2 by nasal cannulae if Sat <92%  -IV Methylprednisolone at 40mg [id number]  -Nebulization frequency increased to Duonebs q4h standing and Albuterol q2 hours PRN.  -f/u RVP  -f/u Pulmonary consult.  -Counseling about smoking cessation, will provide Nicotine patch if needed. -Continuous pulse oximeter and supplemental O2 by nasal cannulae if Sat <92%  -IV Methylprednisolone decreased to q12 hours given clinical improvement.  -Continue with Duonebs q6h standing and Albuterol q4 hours PRN.  -RVP negative  -Pulmonary consult appreciated.  -Counseling about smoking cessation, will provide Nicotine patch if requested.

## 2018-01-23 NOTE — DISCHARGE NOTE ADULT - CARE PROVIDER_API CALL
Laureano Tomlin), Critical Care Medicine; Pulmonary Disease; Sleep Medicine  [address]  [place], [place] [id number]  Phone: [phone]  Fax: [phone]    Guanako Myers); [facility] [place] [address]  Phone: [phone]  Fax: [phone]

## 2018-01-23 NOTE — DISCHARGE NOTE ADULT - CARE PROVIDERS DIRECT ADDRESSES
[email or web address]

## 2018-01-24 VITALS
RESPIRATION RATE: 18 BRPM | SYSTOLIC BLOOD PRESSURE: 118 MMHG | HEART RATE: 78 BPM | DIASTOLIC BLOOD PRESSURE: 76 MMHG | OXYGEN SATURATION: 95 % | TEMPERATURE: 98 F

## 2018-01-24 PROCEDURE — 99232 SBSQ HOSP IP/OBS MODERATE 35: CPT

## 2018-01-24 PROCEDURE — 94640 AIRWAY INHALATION TREATMENT: CPT

## 2018-01-24 PROCEDURE — 99239 HOSP IP/OBS DSCHRG MGMT >30: CPT

## 2018-01-24 PROCEDURE — 87798 DETECT AGENT NOS DNA AMP: CPT

## 2018-01-24 PROCEDURE — 99285 EMERGENCY DEPT VISIT HI MDM: CPT | Mod: 25

## 2018-01-24 PROCEDURE — 96372 THER/PROPH/DIAG INJ SC/IM: CPT

## 2018-01-24 PROCEDURE — 36415 COLL VENOUS BLD VENIPUNCTURE: CPT

## 2018-01-24 PROCEDURE — 87581 M.PNEUMON DNA AMP PROBE: CPT

## 2018-01-24 PROCEDURE — 93005 ELECTROCARDIOGRAM TRACING: CPT

## 2018-01-24 PROCEDURE — 87633 RESP VIRUS 12-25 TARGETS: CPT

## 2018-01-24 PROCEDURE — 85027 COMPLETE CBC AUTOMATED: CPT

## 2018-01-24 PROCEDURE — 87070 CULTURE OTHR SPECIMN AEROBIC: CPT

## 2018-01-24 PROCEDURE — 87486 CHLMYD PNEUM DNA AMP PROBE: CPT

## 2018-01-24 PROCEDURE — 80053 COMPREHEN METABOLIC PANEL: CPT

## 2018-01-24 PROCEDURE — 80048 BASIC METABOLIC PNL TOTAL CA: CPT

## 2018-01-24 PROCEDURE — 71045 X-RAY EXAM CHEST 1 VIEW: CPT

## 2018-01-24 RX ORDER — FLUTICASONE PROPIONATE 50 MCG
1 SPRAY, SUSPENSION NASAL
Qty: 1 | Refills: 0 | OUTPATIENT
Start: 2018-01-24 | End: 2018-01-30

## 2018-01-24 RX ORDER — ALBUTEROL 90 UG/1
3 AEROSOL, METERED ORAL
Qty: 20 | Refills: 0 | OUTPATIENT
Start: 2018-01-24 | End: 2018-02-22

## 2018-01-24 RX ORDER — ALBUTEROL 90 UG/1
2 AEROSOL, METERED ORAL
Qty: 1 | Refills: 0 | OUTPATIENT
Start: 2018-01-24 | End: 2018-02-22

## 2018-01-24 RX ADMIN — SODIUM CHLORIDE 3 MILLILITER(S): 9 INJECTION INTRAMUSCULAR; INTRAVENOUS; SUBCUTANEOUS at 12:21

## 2018-01-24 RX ADMIN — Medication 1 SPRAY(S): at 06:32

## 2018-01-24 RX ADMIN — Medication 3 MILLILITER(S): at 14:50

## 2018-01-24 RX ADMIN — Medication 3 MILLILITER(S): at 02:38

## 2018-01-24 RX ADMIN — Medication 3 MILLILITER(S): at 07:45

## 2018-01-24 RX ADMIN — SODIUM CHLORIDE 3 MILLILITER(S): 9 INJECTION INTRAMUSCULAR; INTRAVENOUS; SUBCUTANEOUS at 06:31

## 2018-01-24 RX ADMIN — Medication 40 MILLIGRAM(S): at 06:32

## 2018-01-24 NOTE — PROGRESS NOTE ADULT - SUBJECTIVE AND OBJECTIVE BOX
Patient is a 22 years old male who presented with a chief complaint of shortness of breath and cough.    Patient was admitted with asthma exacerbation, he has not needed supplemental O2 to maintain O2 saturation above 92 overnight, he states that his cough still persists, his shortness of breath and his chest tightness have however improved. He denies any fever, nausea or vomiting. He states that he is tolerating PO intake of regular diet.    Vital Signs Last 24 Hrs  T(C): 36.7 (23 Jan 2018 23:42), Max: 36.7 (23 Jan 2018 23:42)  T(F): 98 (23 Jan 2018 23:42), Max: 98 (23 Jan 2018 23:42)  HR: 62 (23 Jan 2018 23:42) (62 - 81)  BP: 121/58 (23 Jan 2018 23:42) (117/62 - 123/70)  BP(mean): --  RR: 19 (23 Jan 2018 23:42) (18 - 19)  SpO2: 96% (24 Jan 2018 02:09) (93% - 96%)    General: Patient found resting in bed, in no acute distress.  HEENT: Head is normocephalic, atraumatic, Eyes: EOMI, clear conjunctivae and sclerae, Nose: No nasal discharge. Ears: there is impacted cerumen partially obstructing ear canal bilaterally, tympanic membrane is pearly bilaterally, no bulging, no perforation. Throat: Absence of tonsils is noted, no pharyngeal erythema or exudates.  Neck: Supple  Respiratory: Normal respiratory rate and effort, no use of accessory muscles, minimal wheezing noted on bilateral bases, no crackles, moderate air movement.  Cardiac: Regular rate and rhythm, soft S1 and S2, no murmurs, rubs or gallops.  GI: Bowel sounds present in all quadrants, abdomen is flat, soft, nontender, nondistended.  Extremities: No cyanosis or edema, capillary refill <2 seconds.  Neuro: Alert and oriented x3, no gross deficits.    MEDICATIONS  (STANDING):  ALBUTerol/ipratropium for Nebulization 3 milliLiter(s) Nebulizer every 6 hours  fluticasone propionate 50 MICROgram(s)/spray Nasal Spray 1 Spray(s) Both Nostrils two times a day  methylPREDNISolone sodium succinate Injectable 40 milliGRAM(s) IV Push every 12 hours  sodium chloride 0.9% lock flush 3 milliLiter(s) IV Push every 8 hours    MEDICATIONS  (PRN):  ALBUTerol    0.083% 2.5 milliGRAM(s) Nebulizer every 4 hours PRN Shortness of Breath

## 2018-01-24 NOTE — PROGRESS NOTE ADULT - ASSESSMENT
22 years old male patient with PMHx of asthma who presented with progressively worsening shortness of breath, productive cough and chest pain. Given his medical history and sick contacts, likely asthma exacerbation secondary to URI, (-) RVP, much clinically improved.
22 years old male patient with PMHx of asthma presenting today with progressively worsening shortness of breath, productive cough and chest pain. Given his medical history and sick contacts, likely asthma exacerbation secondary to URI, pending results of RVP.
Asthma exac  Reportedly not compliant as outpt with poor control  Leukocytosis  Clinically improved    REC:    IV steroids  Eventual prednisone taper - possible change to oral steroids in AM  Drug nebs  Needs controller meds upon d/c  Pulm f/u after d/c  Would do IgE level when off steroids  Consider allergy re-testing

## 2018-01-24 NOTE — PROGRESS NOTE ADULT - PROBLEM SELECTOR PLAN 1
-IV Methylprednisolone decreased to qday given clinical improvement.  -Nebulizations PRN  -RVP negative  -Pulmonary consult appreciated.  -Counseling about smoking cessation, will provide Nicotine patch if requested.  -Discharge home today, Prednisone taper.  -Follow up with pulmonology and PCP as outpatient.

## 2018-01-24 NOTE — PROGRESS NOTE ADULT - SUBJECTIVE AND OBJECTIVE BOX
PULMONARY PROGRESS NOTE      LAKESHA BRIGHT  MRN-[id number]    Patient is a 22y old  Male who presents with a chief complaint of Shortness of breath (23 Jan 2018 17:49)      INTERVAL HPI/OVERNIGHT EVENTS:    Patient awake and alert  Feels better with decreased SOB and cough    MEDICATIONS  (STANDING):  ALBUTerol/ipratropium for Nebulization 3 milliLiter(s) Nebulizer every 6 hours  fluticasone propionate 50 MICROgram(s)/spray Nasal Spray 1 Spray(s) Both Nostrils two times a day  methylPREDNISolone sodium succinate Injectable 40 milliGRAM(s) IV Push every 12 hours  sodium chloride 0.9% lock flush 3 milliLiter(s) IV Push every 8 hours      MEDICATIONS  (PRN):  ALBUTerol    0.083% 2.5 milliGRAM(s) Nebulizer every 4 hours PRN Shortness of Breath      Allergies    No Known Drug Allergies  peanuts (Other)    Intolerances        PAST MEDICAL & SURGICAL HISTORY:  Asthma  S/P wrist surgery: right wrist at age 13.  S/P tonsillectomy and adenoidectomy: at age 9        REVIEW OF SYSTEMS:    CONSTITUTIONAL:  No distress    HEENT:  Eyes:  No diplopia or blurred vision. ENT:  No earache, sore throat or runny nose.    CARDIOVASCULAR:  No pressure, squeezing, tightness, heaviness or aching about the chest; no palpitations.    RESPIRATORY:  Improved cough, shortness of breath, no PND or orthopnea. Mild SOBOE    GASTROINTESTINAL:  No nausea, vomiting or diarrhea.    GENITOURINARY:  No dysuria, frequency or urgency.    NEUROLOGIC:  No paresthesias, fasciculations, seizures or weakness.    PSYCHIATRIC:  No disorder of thought or mood.    Vital Signs Last 24 Hrs  T(C): 36.5 (24 Jan 2018 08:46), Max: 36.7 (23 Jan 2018 23:42)  T(F): 97.7 (24 Jan 2018 08:46), Max: 98 (23 Jan 2018 23:42)  HR: 93 (24 Jan 2018 08:46) (62 - 93)  BP: 123/74 (24 Jan 2018 08:46) (121/58 - 123/74)  BP(mean): --  RR: 18 (24 Jan 2018 08:46) (18 - 19)  SpO2: 92% (24 Jan 2018 08:46) (92% - 96%)    PHYSICAL EXAMINATION:    GENERAL: The patient is awake and alert in no apparent distress.     HEENT: Head is normocephalic and atraumatic. Extraocular muscles are intact. Mucous membranes are moist.    NECK: Supple.    LUNGS: Clear to auscultation with isolated wheeze, no rales or rhonchi; respirations unlabored    HEART: Regular rate and rhythm without murmur.    ABDOMEN: Soft, nontender, and nondistended.      EXTREMITIES: Without any cyanosis, clubbing, rash, lesions or edema.    NEUROLOGIC: Grossly intact.    LABS:                        15.8   15.2  )-----------( 241      ( 23 Jan 2018 08:42 )             44.4     01-23    138  |  100  |  15.0  ----------------------------<  136<H>  4.1   |  21.0<L>  |  0.86    Ca    9.8      23 Jan 2018 08:42      MICROBIOLOGY:    Rapid Respiratory Viral Panel (01.23.18 @ 05:42)    Rapid RVP Result: [facility]: The [employer] RVP Rapid uses polymerase chain reaction (PCR) and melt  curve analysis to screen for adenovirus; coronavirus HKU1, NL63, 229E,  OC43; human metapneumovirus (hMPV); human enterovirus/rhinovirus  (Entero/RV); influenza A; influenza A/H1;influenza A/H3; influenza  A/H1-2009; influenza B; parainfluenza viruses 1, 2, 3, 4; respiratory  syncytial virus; Bordetella pertussis; Mycoplasma pneumoniae; and  Chlamydophila pneumoniae.    Culture - Sputum . (01.23.18 @ 06:49)    Gram Stain:   No White blood cells  Few Gram positive cocci in pairs  Few Gram Positive Rods    Specimen Source: .Sputum Sputum    Culture Results:   Numerous Routine respiratory jacob present  Culture in progress        RADIOLOGY & ADDITIONAL STUDIES:     EXAM:  XR CHEST PORTABLE IMMED 1V                          PROCEDURE DATE:  01/22/2018          INTERPRETATION:  CHEST AP PORTABLE:    History: Shortness of breath.    Date and time of exam: 1/22/2018 1:02 PM.    Comparison: No prior exam.    Technique: A single AP view of the chest was obtained.    Findings:  The lungs are clear. The heart and mediastinum are unremarkable.  No   hilar abnormality is seen.  There is no evidence of pleural effusion. The   visualized osseous structures demonstrate no abnormality.    Impression:    Unremarkable exam.        MATTHEW PRIETO M.D., ATTENDING RADIOLOGIST  This document has been electronically signed. Jan 22 2018  1:16PM

## 2018-01-24 NOTE — PROGRESS NOTE ADULT - PROBLEM SELECTOR PROBLEM 1
Exacerbation of intermittent asthma, unspecified asthma severity
Exacerbation of intermittent asthma, unspecified asthma severity

## 2018-01-25 LAB
CULTURE RESULTS: SIGNIFICANT CHANGE UP
SPECIMEN SOURCE: SIGNIFICANT CHANGE UP

## 2018-02-06 ENCOUNTER — APPOINTMENT (OUTPATIENT)
Dept: PULMONOLOGY | Facility: CLINIC | Age: 23
End: 2018-02-06

## 2018-02-17 ENCOUNTER — TRANSCRIPTION ENCOUNTER (OUTPATIENT)
Age: 23
End: 2018-02-17

## 2018-03-15 NOTE — ED ADULT NURSE NOTE - NS ED NURSE RECORD ANOTHER HT AND WT
Return to the ED for any new or worsening symptoms  Take your medication as prescribed  Follow up with your PMD in 2-3 days for a recheck   Advance activity as tolerated
Yes

## 2018-03-20 ENCOUNTER — APPOINTMENT (OUTPATIENT)
Dept: PULMONOLOGY | Facility: CLINIC | Age: 23
End: 2018-03-20
Payer: MEDICAID

## 2018-03-20 VITALS
SYSTOLIC BLOOD PRESSURE: 120 MMHG | WEIGHT: 160 LBS | HEART RATE: 68 BPM | DIASTOLIC BLOOD PRESSURE: 70 MMHG | BODY MASS INDEX: 21.67 KG/M2 | OXYGEN SATURATION: 97 % | HEIGHT: 72 IN

## 2018-03-20 DIAGNOSIS — Z00.00 ENCOUNTER FOR GENERAL ADULT MEDICAL EXAMINATION W/OUT ABNORMAL FINDINGS: ICD-10-CM

## 2018-03-20 PROCEDURE — 94060 EVALUATION OF WHEEZING: CPT

## 2018-03-20 PROCEDURE — 99214 OFFICE O/P EST MOD 30 MIN: CPT | Mod: 25

## 2018-03-20 PROCEDURE — 94664 DEMO&/EVAL PT USE INHALER: CPT | Mod: 59

## 2018-03-20 RX ORDER — ALBUTEROL SULFATE 90 UG/1
108 (90 BASE) AEROSOL, METERED RESPIRATORY (INHALATION)
Refills: 0 | Status: ACTIVE | COMMUNITY

## 2018-03-20 RX ORDER — ALBUTEROL SULFATE 2.5 MG/3ML
(2.5 MG/3ML) SOLUTION RESPIRATORY (INHALATION)
Refills: 0 | Status: ACTIVE | COMMUNITY

## 2018-04-09 NOTE — PATIENT PROFILE ADULT. - NS TRANSFER PATIENT BELONGINGS
Try taking tums as well  Could be due to reflux    Will follow up her ultrasound  once it is read shirt, pants, hoodie, cellphone and , socks, underwear/Clothing/Cell Phone/PDA (specify)

## 2018-04-16 NOTE — H&P ADULT - NSHPLANGLIMITEDENGLISH_GEN_A_CORE
[facility]  Patient is currently open to home care services with [facility].  The patient is currently receiving RN,PT,OT and HHA  services.  [facility]  and team have been notified of patient admission.  [facility] liaison will continue to follow patient during stay.  If appropriate provide orders to resume home care at time of discharge.    Nurys Lentz RN Liaison   [facility]       No

## 2018-05-24 ENCOUNTER — APPOINTMENT (OUTPATIENT)
Dept: PULMONOLOGY | Facility: CLINIC | Age: 23
End: 2018-05-24

## 2018-06-03 ENCOUNTER — INPATIENT (INPATIENT)
Facility: HOSPITAL | Age: 23
LOS: 0 days | Discharge: HOME | DRG: 203 | End: 2018-06-04
Attending: INTERNAL MEDICINE | Admitting: INTERNAL MEDICINE
Payer: MEDICAID

## 2018-06-03 VITALS
SYSTOLIC BLOOD PRESSURE: 139 MMHG | WEIGHT: 154.98 LBS | DIASTOLIC BLOOD PRESSURE: 86 MMHG | OXYGEN SATURATION: 97 % | TEMPERATURE: 98 F | HEIGHT: 72 IN | RESPIRATION RATE: 24 BRPM | HEART RATE: 96 BPM

## 2018-06-03 DIAGNOSIS — Z29.9 ENCOUNTER FOR PROPHYLACTIC MEASURES, UNSPECIFIED: ICD-10-CM

## 2018-06-03 DIAGNOSIS — Z98.890 OTHER SPECIFIED POSTPROCEDURAL STATES: Chronic | ICD-10-CM

## 2018-06-03 DIAGNOSIS — J45.51 SEVERE PERSISTENT ASTHMA WITH (ACUTE) EXACERBATION: ICD-10-CM

## 2018-06-03 DIAGNOSIS — Z90.89 ACQUIRED ABSENCE OF OTHER ORGANS: Chronic | ICD-10-CM

## 2018-06-03 DIAGNOSIS — J45.909 UNSPECIFIED ASTHMA, UNCOMPLICATED: ICD-10-CM

## 2018-06-03 LAB
ALBUMIN SERPL ELPH-MCNC: 4.8 G/DL — SIGNIFICANT CHANGE UP (ref 3.3–5.2)
ALP SERPL-CCNC: 87 U/L — SIGNIFICANT CHANGE UP (ref 40–120)
ALT FLD-CCNC: 19 U/L — SIGNIFICANT CHANGE UP
ANION GAP SERPL CALC-SCNC: 15 MMOL/L — SIGNIFICANT CHANGE UP (ref 5–17)
AST SERPL-CCNC: 25 U/L — SIGNIFICANT CHANGE UP
BASOPHILS # BLD AUTO: 0 K/UL — SIGNIFICANT CHANGE UP (ref 0–0.2)
BASOPHILS NFR BLD AUTO: 0.2 % — SIGNIFICANT CHANGE UP (ref 0–2)
BILIRUB SERPL-MCNC: 0.3 MG/DL — LOW (ref 0.4–2)
BUN SERPL-MCNC: 15 MG/DL — SIGNIFICANT CHANGE UP (ref 8–20)
CALCIUM SERPL-MCNC: 9.5 MG/DL — SIGNIFICANT CHANGE UP (ref 8.6–10.2)
CHLORIDE SERPL-SCNC: 104 MMOL/L — SIGNIFICANT CHANGE UP (ref 98–107)
CO2 SERPL-SCNC: 20 MMOL/L — LOW (ref 22–29)
CREAT SERPL-MCNC: 0.78 MG/DL — SIGNIFICANT CHANGE UP (ref 0.5–1.3)
EOSINOPHIL # BLD AUTO: 0 K/UL — SIGNIFICANT CHANGE UP (ref 0–0.5)
EOSINOPHIL NFR BLD AUTO: 0.4 % — SIGNIFICANT CHANGE UP (ref 0–5)
GLUCOSE SERPL-MCNC: 127 MG/DL — HIGH (ref 70–115)
HCT VFR BLD CALC: 46.3 % — SIGNIFICANT CHANGE UP (ref 42–52)
HGB BLD-MCNC: 15.9 G/DL — SIGNIFICANT CHANGE UP (ref 14–18)
LYMPHOCYTES # BLD AUTO: 0.7 K/UL — LOW (ref 1–4.8)
LYMPHOCYTES # BLD AUTO: 12.5 % — LOW (ref 20–55)
MAGNESIUM SERPL-MCNC: 2.4 MG/DL — SIGNIFICANT CHANGE UP (ref 1.6–2.6)
MCHC RBC-ENTMCNC: 27.8 PG — SIGNIFICANT CHANGE UP (ref 27–31)
MCHC RBC-ENTMCNC: 34.3 G/DL — SIGNIFICANT CHANGE UP (ref 32–36)
MCV RBC AUTO: 81.1 FL — SIGNIFICANT CHANGE UP (ref 80–94)
MONOCYTES # BLD AUTO: 0.1 K/UL — SIGNIFICANT CHANGE UP (ref 0–0.8)
MONOCYTES NFR BLD AUTO: 1.1 % — LOW (ref 3–10)
NEUTROPHILS # BLD AUTO: 4.8 K/UL — SIGNIFICANT CHANGE UP (ref 1.8–8)
NEUTROPHILS NFR BLD AUTO: 85.6 % — HIGH (ref 37–73)
PHOSPHATE SERPL-MCNC: 3 MG/DL — SIGNIFICANT CHANGE UP (ref 2.4–4.7)
PLATELET # BLD AUTO: 219 K/UL — SIGNIFICANT CHANGE UP (ref 150–400)
POTASSIUM SERPL-MCNC: 4.1 MMOL/L — SIGNIFICANT CHANGE UP (ref 3.5–5.3)
POTASSIUM SERPL-SCNC: 4.1 MMOL/L — SIGNIFICANT CHANGE UP (ref 3.5–5.3)
PROT SERPL-MCNC: 7.6 G/DL — SIGNIFICANT CHANGE UP (ref 6.6–8.7)
RAPID RVP RESULT: SIGNIFICANT CHANGE UP
RBC # BLD: 5.71 M/UL — SIGNIFICANT CHANGE UP (ref 4.6–6.2)
RBC # FLD: 13.2 % — SIGNIFICANT CHANGE UP (ref 11–15.6)
SODIUM SERPL-SCNC: 139 MMOL/L — SIGNIFICANT CHANGE UP (ref 135–145)
WBC # BLD: 5.5 K/UL — SIGNIFICANT CHANGE UP (ref 4.8–10.8)
WBC # FLD AUTO: 5.5 K/UL — SIGNIFICANT CHANGE UP (ref 4.8–10.8)

## 2018-06-03 PROCEDURE — 71046 X-RAY EXAM CHEST 2 VIEWS: CPT | Mod: 26

## 2018-06-03 PROCEDURE — 99284 EMERGENCY DEPT VISIT MOD MDM: CPT | Mod: 25

## 2018-06-03 PROCEDURE — 99222 1ST HOSP IP/OBS MODERATE 55: CPT

## 2018-06-03 PROCEDURE — 99223 1ST HOSP IP/OBS HIGH 75: CPT | Mod: GC

## 2018-06-03 RX ORDER — ALBUTEROL 90 UG/1
1 AEROSOL, METERED ORAL EVERY 4 HOURS
Qty: 0 | Refills: 0 | Status: DISCONTINUED | OUTPATIENT
Start: 2018-06-03 | End: 2018-06-04

## 2018-06-03 RX ORDER — DEXAMETHASONE 0.5 MG/5ML
10 ELIXIR ORAL ONCE
Qty: 0 | Refills: 0 | Status: DISCONTINUED | OUTPATIENT
Start: 2018-06-03 | End: 2018-06-03

## 2018-06-03 RX ORDER — IPRATROPIUM/ALBUTEROL SULFATE 18-103MCG
3 AEROSOL WITH ADAPTER (GRAM) INHALATION
Qty: 0 | Refills: 0 | Status: DISCONTINUED | OUTPATIENT
Start: 2018-06-03 | End: 2018-06-03

## 2018-06-03 RX ORDER — MAGNESIUM SULFATE 500 MG/ML
2 VIAL (ML) INJECTION ONCE
Qty: 0 | Refills: 0 | Status: COMPLETED | OUTPATIENT
Start: 2018-06-03 | End: 2018-06-03

## 2018-06-03 RX ORDER — IPRATROPIUM/ALBUTEROL SULFATE 18-103MCG
3 AEROSOL WITH ADAPTER (GRAM) INHALATION ONCE
Qty: 0 | Refills: 0 | Status: COMPLETED | OUTPATIENT
Start: 2018-06-03 | End: 2018-06-03

## 2018-06-03 RX ORDER — MONTELUKAST 4 MG/1
10 TABLET, CHEWABLE ORAL AT BEDTIME
Qty: 0 | Refills: 0 | Status: DISCONTINUED | OUTPATIENT
Start: 2018-06-03 | End: 2018-06-04

## 2018-06-03 RX ORDER — PANTOPRAZOLE SODIUM 20 MG/1
40 TABLET, DELAYED RELEASE ORAL
Qty: 0 | Refills: 0 | Status: DISCONTINUED | OUTPATIENT
Start: 2018-06-03 | End: 2018-06-04

## 2018-06-03 RX ORDER — DEXAMETHASONE 0.5 MG/5ML
10 ELIXIR ORAL ONCE
Qty: 0 | Refills: 0 | Status: COMPLETED | OUTPATIENT
Start: 2018-06-03 | End: 2018-06-03

## 2018-06-03 RX ORDER — IPRATROPIUM/ALBUTEROL SULFATE 18-103MCG
3 AEROSOL WITH ADAPTER (GRAM) INHALATION EVERY 4 HOURS
Qty: 0 | Refills: 0 | Status: DISCONTINUED | OUTPATIENT
Start: 2018-06-03 | End: 2018-06-04

## 2018-06-03 RX ORDER — TIOTROPIUM BROMIDE 18 UG/1
1 CAPSULE ORAL; RESPIRATORY (INHALATION) DAILY
Qty: 0 | Refills: 0 | Status: DISCONTINUED | OUTPATIENT
Start: 2018-06-03 | End: 2018-06-04

## 2018-06-03 RX ORDER — ALBUTEROL 90 UG/1
2.5 AEROSOL, METERED ORAL ONCE
Qty: 0 | Refills: 0 | Status: COMPLETED | OUTPATIENT
Start: 2018-06-03 | End: 2018-06-03

## 2018-06-03 RX ADMIN — Medication 100 GRAM(S): at 08:44

## 2018-06-03 RX ADMIN — Medication 3 MILLILITER(S): at 15:05

## 2018-06-03 RX ADMIN — ALBUTEROL 2.5 MILLIGRAM(S): 90 AEROSOL, METERED ORAL at 08:46

## 2018-06-03 RX ADMIN — MONTELUKAST 10 MILLIGRAM(S): 4 TABLET, CHEWABLE ORAL at 21:51

## 2018-06-03 RX ADMIN — Medication 3 MILLILITER(S): at 07:29

## 2018-06-03 RX ADMIN — Medication 3 MILLILITER(S): at 23:51

## 2018-06-03 RX ADMIN — Medication 3 MILLILITER(S): at 07:39

## 2018-06-03 RX ADMIN — Medication 40 MILLIGRAM(S): at 23:02

## 2018-06-03 RX ADMIN — Medication 3 MILLILITER(S): at 20:12

## 2018-06-03 RX ADMIN — Medication 102 MILLIGRAM(S): at 07:54

## 2018-06-03 RX ADMIN — Medication 40 MILLIGRAM(S): at 18:03

## 2018-06-03 NOTE — CONSULT NOTE ADULT - SUBJECTIVE AND OBJECTIVE BOX
PULMONARY CONSULT NOTE      LAKESHA BRIGHTN-[id number]    Patient is a 22y old  Male who presents with a chief complaint of dyspnea  Know to me from office FU post adm in January  Worsening dyspnea and wheeze despite albuterol for 2 days  Ill son at home  Patient has issues at home.  His mother has prevented him from using controller medicine prescribed in March (symbicort and montelukast)  She also did not allow him to have asthma profile I ordered to assess for allergy  No h/o asa intolerance or nasal polyps.  No intubation.  In the past steroid Rx was < once per year  No obvious exposure issue  PFT in office with moderate obstruction and no Beta agonist response    HISTORY OF PRESENT ILLNESS:    MEDICATIONS  (STANDING):  ALBUTerol/ipratropium for Nebulization 3 milliLiter(s) Nebulizer every 3 hours  methylPREDNISolone sodium succinate Injectable 40 milliGRAM(s) IV Push every 12 hours      MEDICATIONS  (PRN):      Allergies    No Known Drug Allergies  peanuts (Other)    Intolerances        PAST MEDICAL & SURGICAL HISTORY:  Asthma  S/P wrist surgery: right wrist at age 13.  S/P tonsillectomy and adenoidectomy: at age 9      FAMILY HISTORY:  No pertinent family history in first degree relatives      SOCIAL HISTORY  Smoking History:     REVIEW OF SYSTEMS:    CONSTITUTIONAL:  No fevers, chills, sweats    HEENT:  Eyes:  No diplopia or blurred vision. ENT:  No earache, sore throat or runny nose.    CARDIOVASCULAR:  No pressure, squeezing, tightness, or heaviness about the chest; no palpitations.    RESPIRATORY:  No  PND or orthopnea. Mod SOBOE    GASTROINTESTINAL:  No abdominal pain, nausea, vomiting or diarrhea.    GENITOURINARY:  No dysuria, frequency or urgency.    NEUROLOGIC:  No paresthesias, fasciculations, seizures or weakness.    PSYCHIATRIC:  No disorder of thought or mood.    Vital Signs Last 24 Hrs  T(C): 36.4 (03 Jun 2018 11:21), Max: 36.6 (03 Jun 2018 07:18)  T(F): 97.5 (03 Jun 2018 11:21), Max: 97.9 (03 Jun 2018 07:18)  HR: 86 (03 Jun 2018 11:21) (76 - 96)  BP: 122/64 (03 Jun 2018 11:21) (117/79 - 139/86)  BP(mean): --  RR: 20 (03 Jun 2018 11:21) (18 - 24)  SpO2: 99% (03 Jun 2018 11:21) (96% - 99%)    PHYSICAL EXAMINATION:    GENERAL: The patient is a well-developed, well-nourished _____in no apparent distress. No accessory muscle use.     HEENT: Head is normocephalic and atraumatic. Extraocular muscles are intact. Mucous membranes are moist.     NECK: Supple.     LUNGS: Good air entry diffuse exp wz to auscultation without rales. Respirations unlabored    HEART: Regular rate and rhythm without murmur.    ABDOMEN: Soft, nontender, and nondistended.  No hepatosplenomegaly is noted.    EXTREMITIES: Without any cyanosis, clubbing, rash, lesions or edema.    NEUROLOGIC: Grossly intact.      No data available when seen

## 2018-06-03 NOTE — ED PROVIDER NOTE - OBJECTIVE STATEMENT
23 yo male with a pmh of asthma presents today with SOB. He states that he has been having trouble breathing for the past 2 day with no relief. He has taken his rescue inhaler and his nebulizer throughout this time with continuous discomfort. He states he was intubated once when he was 3 yo and has presented to the hospital for asthma exacerbations in the past several months. 21 yo male with a pmh of asthma presents today with SOB. He states that he has been having trouble breathing for the past 2 day with no relief. He has taken his rescue inhaler and his nebulizer throughout this time with continuous discomfort. He states to have had a cough with minimal production of yellow phlegm and to have been around his son that currently had a cold. He also experience some chest tightness and headaches. He states he was intubated once when he was 3 yo and has been admitted to the hospital for an asthma exacerbation in January. He denies any other symptoms including fever, chills, abdominal pain, or chest pain.

## 2018-06-03 NOTE — H&P ADULT - NSHPPHYSICALEXAM_GEN_ALL_CORE
Vital Signs Last 24 Hrs  T(C): 36.4 (03 Jun 2018 11:21), Max: 36.6 (03 Jun 2018 07:18)  T(F): 97.5 (03 Jun 2018 11:21), Max: 97.9 (03 Jun 2018 07:18)  HR: 86 (03 Jun 2018 11:21) (76 - 96)  BP: 122/64 (03 Jun 2018 11:21) (117/79 - 139/86)  RR: 20 (03 Jun 2018 11:21) (18 - 24)  SpO2: 99% (03 Jun 2018 11:21) (96% - 99%)    General: Well nourished/Well developed, NAD  Head:  Normocephalic, atraumatic  ENT:  Mucosa moist, no ulcerations  Neck:  Supple, no sinuses or palpable masses  Lymphatic:  No palpable cervical, supraclavicular adenopathy  Respiratory: generalized bilateral expiratory wheezing and bilateral upper inspiratory wheezing. No crackles  CV: RRR, S1S2, no murmur  Abdominal: Soft, NT, ND no palpable mass  Extremities: No edema, + peripheral pulses, FROM all 4 extremity  Neurology: A&Ox3, no focalization signs  Incisions: intact, no erythema or drainage Vital Signs Last 24 Hrs  T(C): 36.4 (03 Jun 2018 11:21), Max: 36.6 (03 Jun 2018 07:18)  T(F): 97.5 (03 Jun 2018 11:21), Max: 97.9 (03 Jun 2018 07:18)  HR: 86 (03 Jun 2018 11:21) (76 - 96)  BP: 122/64 (03 Jun 2018 11:21) (117/79 - 139/86)  RR: 20 (03 Jun 2018 11:21) (18 - 24)  SpO2: 99% (03 Jun 2018 11:21) (96% - 99%)    General: Well nourished/Well developed, NAD  Head:  Normocephalic, atraumatic  ENT:  Mucosa moist, no ulcerations  Neck:  Supple, no sinuses or palpable masses  Lymphatic:  No palpable cervical, supraclavicular adenopathy  Respiratory: generalized bilateral expiratory wheezing and bilateral upper inspiratory wheezing. No crackles Fair air entry.   CV: RRR, S1S2, no murmur  Abdominal: Soft, NT, ND no palpable mass  Extremities: No edema, + peripheral pulses, FROM all 4 extremity  Neurology: A&Ox3, no focalization signs  Incisions: intact, no erythema or drainage

## 2018-06-03 NOTE — ED ADULT NURSE NOTE - OBJECTIVE STATEMENT
pt presents to ED with asthma exacerbation x 3 days .pt states she ran our of his inhalers. pt's breathing is labored with b/l wheezing noted. a&ox3. will continue to monitor and reassess

## 2018-06-03 NOTE — H&P ADULT - ATTENDING COMMENTS
Patient seen and examined at bedside with PGY 1 and PGY 3 residents. Agree with above plan of care with below noted adjustments to management.    22 y.o M with hx of childhood asthma, recent hospitalization at [facility] in Jan 2018 and at that time was advised to f/u with pulm and c/w symbicort and montelukast. S/p PFT in the pulm office with moderate obstruction and no Beta agonist response, thereafter did not continue with maintenance medications as per social issues with mother who did not allow him to do so. Recent sick contact (patients son) and need for increased nebulizer and inhaler use (albuterol) who presents with worsening sob. Pt admitted with asthma exacerbation likely secondary to medication non compliance and positive sick contact. Patient with noted exp wheezing on exam, fair air entry, feels much improved then from when he was first in the ER a few hours prior. Continued on IV steroids, routine nebulizers, montelukast. Peak flow daily. Evaluated by pulm and recommended on discharge to be placed on LABA/LAMA and ICS. PPI for primary ppx on steroids. Labs wnl, RVP negative. Patient counseled on medication compliance, offered social report for issues with mother which the patient declined. Patient likely will need anticipated stay for 24 hours. Thereafter if remains stable discharge to home.

## 2018-06-03 NOTE — H&P ADULT - NSHPLABSRESULTS_GEN_ALL_CORE
CBC, CMP, RVP, mag, phosp pending          EXAM:  XR CHEST PA LAT 2V                        PROCEDURE DATE:  06/03/2018    INTERPRETATION:    Chest radiographs      CLINICAL INFORMATION:  3 days of  Short of breath.   TECHNIQUE:  Frontal and lateral views of the chest were obtained.  FINDINGS:  1/22/2018 available for review.  The lungs are clear.  No pleural abnormality is seen.    The heart and mediastinum appear intact.       IMPRESSION:  No evidence of active chest disease.         NIC WAGONER M.D., ATTENDING RADIOLOGIST  This document has been electronically signed. Barney  3 2018  1:44PM

## 2018-06-03 NOTE — H&P ADULT - HISTORY OF PRESENT ILLNESS
22 YOM with PMH of asthma since childhood with increased use of his albuterol recently, last admission 5 months ago for asthma exacerbation, who was brought in to the Ed by a friend with c/o shortness of breath. Patient mentions that for the past weeks has been using his albuterol nebulizer every 4 hrs and the inhales every 2 hrs but for the past 3 days he has been feeling more short of breath, has been waking at night with asthma symptoms daily, has been cough yellowish phlegm, has been wheezing and using the nebulizer almost every 2 hrs and the albuterol inhaler almost every hour. He spent the night at a friend's house, he took his nebulizer, which he needed last night but he ran out of albuterol and felt that his shortness of breath increased. He mentions that his son had an URI 3 days ago. Patient denies fever, hemoptysis, mentions vaccinations up to date.  Of note, patient was supposed to follow with pulmonology, [facility] since last admission but has not been going due to social issues. He also mentions that last time with his PMD was 6 months ago and recognizes poor follow up compliance.    In the ED patient received decadron 10mg, magnesium sulf 2g and Duoneb x 2. 22 Y.O. M with PMH of asthma since childhood with increased use of his albuterol recently, last admission 5 months ago for asthma exacerbation, who was brought in to the Ed by a friend with c/o shortness of breath. Patient mentions that for the past weeks has been using his albuterol nebulizer every 4 hrs and the inhales every 2 hrs but for the past 3 days he has been feeling more short of breath, has been waking at night with asthma symptoms daily, has been cough yellowish phlegm, has been wheezing and using the nebulizer almost every 2 hrs and the albuterol inhaler almost every hour. He spent the night at a friend's house, he took his nebulizer, which he needed last night but he ran out of albuterol and felt that his shortness of breath increased. He mentions that his son had an URI 3 days ago. Patient denies fever, hemoptysis, mentions vaccinations up to date.  Of note, patient was supposed to follow with pulmonology, [facility] since last admission but has not been going due to social issues. He also mentions that last time with his PMD was 6 months ago and recognizes poor follow up compliance.    In the ED patient received decadron 10mg, magnesium sulf 2g and Duoneb x 2.

## 2018-06-03 NOTE — H&P ADULT - PROBLEM SELECTOR PLAN 2
DVT: Pneumatic compression boots  IMPROVE risk score: prob of symptomatic VTE: 0.4%  Prob of major bleedin.1%, prob of clinically important risk: 0.5%

## 2018-06-03 NOTE — ED ADULT NURSE REASSESSMENT NOTE - NS ED NURSE REASSESS COMMENT FT1
pt admitted. verbal report given to CHANDNI Lew in ED holding area. pt resting comfortably in stretcher. breathing is even and unlabored. NAD VSS

## 2018-06-03 NOTE — ED PROVIDER NOTE - ATTENDING CONTRIBUTION TO CARE
I, Gabe Sanchez, performed a face to face bedside interview with this patient regarding history of present illness, review of symptoms and relevant past medical, social and family history.  I completed an independent physical examination. I have communicated the patient’s plan of care and disposition with the ACP.  22 year old male with PMH asthma presents with SOB and asthma  Gen: NAD, well appearing  CV: RRR  Pul: wheezing diffuse, using accessory muscles of respiration  Abd: Soft, non-distended, non-tender  Neuro: no focal deficits  Pt states he feels better and reps effort is significantly improve,d however persistent diffuse wheezxing depsite aggressive tx total of 4 albuterol, 2 atrovent, steroids, mag. WIll admit for asthma exacerbation

## 2018-06-03 NOTE — H&P ADULT - ASSESSMENT
23 YO M with PMH of asthma, now severe who is admitted for asthma exacerbation likely 2/2 poor medication compliance and URI

## 2018-06-03 NOTE — H&P ADULT - FAMILY HISTORY
Father  Still living? Unknown  Family history of asthma, Age at diagnosis: Age Unknown     Mother  Still living? Unknown  Family history of asthma, Age at diagnosis: Age Unknown  Family history of bipolar disorder, Age at diagnosis: Age Unknown

## 2018-06-03 NOTE — CONSULT NOTE ADULT - ASSESSMENT
Assess    Moderate asthma with remodelling (fixed airflow obstruction)  Acute exacerbation      Rec    DuoNeb  Steroid with taper  Montelukast  Must have controller on Discharge (Inhaled corticosteroid and montelukast)  Favor LABA/LAMA and ICS on DC for now  Home situation should be addressed if possible (?/ ?family discussion)  Asthma profile or allergy consultation as Out Patient when off steroid   Will Follow

## 2018-06-03 NOTE — H&P ADULT - PROBLEM SELECTOR PLAN 1
Likely 2/2 poor medication compliance + URI  CXR shows no signs of pneumonia  Admit to residents service, any bed with   Duonebs Q3 hrs for now  Solumedrol 40mg Q 6 hrs  F/U CBC, CMP, mag, phosp, RVP  Diet: regular  Activity as tolerated  Pantoprazole while on steroids Likely 2/2 poor medication compliance + URI  CXR shows no signs of pneumonia  Admit to medicine residents service, any bed with   Duonebs Q3 hrs for now  Solumedrol 40mg Q 6 hrs  F/U CBC, CMP, mag, phosp, RVP  Diet: regular  Activity as tolerated  Pantoprazole while on steroids  case discussed with attending, Dr Toth Likely 2/2 poor medication compliance + URI  CXR shows no signs of pneumonia  Admit to medicine residents service, any bed with   Duonebs Q3 hrs for now  Solumedrol 40mg Q 6 hrs  F/U CBC, CMP, mag, phosp, RVP  Diet: regular  Activity as tolerated  Pantoprazole while on steroids  Pulmonology consulted, reccommened montelukast, LABA/LAMA and ICS on DC for now and asthma profile/allergy consultation as outpatient  case discussed with attending, Dr Toth

## 2018-06-03 NOTE — H&P ADULT - NSHPREVIEWOFSYSTEMS_GEN_ALL_CORE
CONSTITUTIONAL: No weakness, fevers or chills  EYES/ENT: No visual changes;  No vertigo or throat pain   NECK: No pain or stiffness  RESPIRATORY: As per HPI  CARDIOVASCULAR: No chest pain or palpitations  GASTROINTESTINAL: No abdominal or epigastric pain. No nausea, vomiting, or hematemesis; No diarrhea or constipation. No melena or hematochezia.  GENITOURINARY: No dysuria, frequency or hematuria  PSYCH: normal affect and interaction  EXTREMITIES: no extremities weakness or rashes, joint pain  NEUROLOGICAL: No numbness or weakness  SKIN: No itching, burning, rashes, or lesions

## 2018-06-03 NOTE — ED PROVIDER NOTE - PROGRESS NOTE DETAILS
pt is still using accessory muscles to breath and wheezing heard diffusely. Mag and albuterol will ordered for optimal treatment.

## 2018-06-03 NOTE — ED PROVIDER NOTE - CHPI ED SYMPTOMS POS
SHORTNESS OF BREATH/WHEEZING/DIFFICULTY BREATHING COUGH/WHEEZING/SHORTNESS OF BREATH/DIFFICULTY BREATHING

## 2018-06-03 NOTE — ED PROVIDER NOTE - CARE PLAN
Principal Discharge DX:	Asthma, unspecified asthma severity, unspecified whether complicated, unspecified whether persistent

## 2018-06-03 NOTE — ED ADULT NURSE REASSESSMENT NOTE - NS ED NURSE REASSESS COMMENT FT1
pt states he is feeling better but still wheezing. pt agreed to be admitted to hospital for further eval. will continue to monitor and reassess

## 2018-06-04 ENCOUNTER — TRANSCRIPTION ENCOUNTER (OUTPATIENT)
Age: 23
End: 2018-06-04

## 2018-06-04 VITALS — OXYGEN SATURATION: 97 % | HEART RATE: 70 BPM

## 2018-06-04 PROCEDURE — 84100 ASSAY OF PHOSPHORUS: CPT

## 2018-06-04 PROCEDURE — 96375 TX/PRO/DX INJ NEW DRUG ADDON: CPT

## 2018-06-04 PROCEDURE — 99239 HOSP IP/OBS DSCHRG MGMT >30: CPT

## 2018-06-04 PROCEDURE — 99232 SBSQ HOSP IP/OBS MODERATE 35: CPT

## 2018-06-04 PROCEDURE — 99285 EMERGENCY DEPT VISIT HI MDM: CPT | Mod: 25

## 2018-06-04 PROCEDURE — 94640 AIRWAY INHALATION TREATMENT: CPT

## 2018-06-04 PROCEDURE — 87581 M.PNEUMON DNA AMP PROBE: CPT

## 2018-06-04 PROCEDURE — 83735 ASSAY OF MAGNESIUM: CPT

## 2018-06-04 PROCEDURE — 87486 CHLMYD PNEUM DNA AMP PROBE: CPT

## 2018-06-04 PROCEDURE — 71046 X-RAY EXAM CHEST 2 VIEWS: CPT

## 2018-06-04 PROCEDURE — 80053 COMPREHEN METABOLIC PANEL: CPT

## 2018-06-04 PROCEDURE — 96374 THER/PROPH/DIAG INJ IV PUSH: CPT

## 2018-06-04 PROCEDURE — 87798 DETECT AGENT NOS DNA AMP: CPT

## 2018-06-04 PROCEDURE — 85027 COMPLETE CBC AUTOMATED: CPT

## 2018-06-04 PROCEDURE — 87633 RESP VIRUS 12-25 TARGETS: CPT

## 2018-06-04 RX ORDER — MONTELUKAST 4 MG/1
1 TABLET, CHEWABLE ORAL
Qty: 30 | Refills: 0 | OUTPATIENT
Start: 2018-06-04 | End: 2018-07-03

## 2018-06-04 RX ORDER — ALBUTEROL 90 UG/1
2 AEROSOL, METERED ORAL
Qty: 1 | Refills: 0 | OUTPATIENT
Start: 2018-06-04 | End: 2018-07-03

## 2018-06-04 RX ORDER — IPRATROPIUM/ALBUTEROL SULFATE 18-103MCG
2 AEROSOL WITH ADAPTER (GRAM) INHALATION
Qty: 1 | Refills: 0 | OUTPATIENT
Start: 2018-06-04 | End: 2018-07-03

## 2018-06-04 RX ORDER — BUDESONIDE AND FORMOTEROL FUMARATE DIHYDRATE 160; 4.5 UG/1; UG/1
2 AEROSOL RESPIRATORY (INHALATION)
Qty: 1 | Refills: 0 | OUTPATIENT
Start: 2018-06-04 | End: 2018-07-03

## 2018-06-04 RX ORDER — IPRATROPIUM/ALBUTEROL SULFATE 18-103MCG
3 AEROSOL WITH ADAPTER (GRAM) INHALATION
Qty: 360 | Refills: 0 | OUTPATIENT
Start: 2018-06-04 | End: 2018-07-03

## 2018-06-04 RX ADMIN — Medication 3 MILLILITER(S): at 11:54

## 2018-06-04 RX ADMIN — Medication 3 MILLILITER(S): at 08:47

## 2018-06-04 RX ADMIN — Medication 40 MILLIGRAM(S): at 05:34

## 2018-06-04 RX ADMIN — PANTOPRAZOLE SODIUM 40 MILLIGRAM(S): 20 TABLET, DELAYED RELEASE ORAL at 05:34

## 2018-06-04 RX ADMIN — Medication 3 MILLILITER(S): at 03:33

## 2018-06-04 RX ADMIN — Medication 40 MILLIGRAM(S): at 12:25

## 2018-06-04 NOTE — DISCHARGE NOTE ADULT - PATIENT PORTAL LINK FT
You can access the [facility] Patient Portal, offered by [facility], by registering with the following website: [email or web address]

## 2018-06-04 NOTE — PROGRESS NOTE ADULT - ASSESSMENT
22 years old male patient with PMHx of asthma who presented with progressively worsening shortness of breath, productive cough and chest pain. Given his medical history and sick contacts, likely asthma exacerbation secondary to URI, (-) RVP, much clinically improved. 22 years old male patient with PMHx of asthma who presented with progressively worsening shortness of breath, productive cough and chest pain. Given his medical history and sick contacts, likely asthma exacerbation secondary to URI, (-) RVP, much clinically improved. For discharge today.

## 2018-06-04 NOTE — PROGRESS NOTE ADULT - ASSESSMENT
Asthma exac  Clinically improved    Rec:    Decrease IV steroids  Eventual prednisone with taper  Drug nebs  Ambulate  On PPI  On Singulair  Pulm f/u after d/c

## 2018-06-04 NOTE — PROGRESS NOTE ADULT - SUBJECTIVE AND OBJECTIVE BOX
Patient is a 22 years old male who presented with a chief complaint of shortness of breath and cough.    Patient was admitted with asthma exacerbation, he has not needed supplemental O2 to maintain O2 saturation above 92 overnight, he states that his cough still persists, his shortness of breath and his chest tightness have however improved. He denies any fever, nausea or vomiting. He states that he is tolerating PO intake of regular diet.    Vital Signs   T(F): 98 (04 Jun 2018 04:14), Max: 98.2 (03 Jun 2018 16:38)  HR: 72 (04 Jun 2018 04:14) (64 - 96)  BP: 111/54 (04 Jun 2018 04:14) (111/54 - 141/76)  RR: 18 (04 Jun 2018 04:14) (17 - 24)  SpO2: 100% (04 Jun 2018 04:14) (96% - 100%)        General: Patient found resting in bed, in no acute distress.  HEENT: Head is normocephalic, atraumatic, Eyes: EOMI, clear conjunctivae and sclerae, Nose: No nasal discharge. Ears: there is impacted cerumen partially obstructing ear canal bilaterally, tympanic membrane is pearly bilaterally, no bulging, no perforation. Throat: Absence of tonsils is noted, no pharyngeal erythema or exudates.  Neck: Supple  Respiratory: Normal respiratory rate and effort, no use of accessory muscles, Diffuse expiratory wheezes   Cardiac: Regular rate and rhythm, soft S1 and S2, no murmurs, rubs or gallops.  GI: abdomen is flat, soft, nontender, nondistended.  Extremities: No cyanosis or edema,   Neuro: Alert and oriented x3    MEDICATIONS  (STANDING):  ALBUTerol/ipratropium for Nebulization 3 milliLiter(s) Nebulizer every 6 hours  fluticasone propionate 50 MICROgram(s)/spray Nasal Spray 1 Spray(s) Both Nostrils two times a day  methylPREDNISolone sodium succinate Injectable 40 milliGRAM(s) IV Push every 12 hours  sodium chloride 0.9% lock flush 3 milliLiter(s) IV Push every 8 hours    MEDICATIONS  (PRN):  ALBUTerol    0.083% 2.5 milliGRAM(s) Nebulizer every 4 hours PRN Shortness of Breath Patient is a 22 years old male who presented with a chief complaint of shortness of breath and cough.    Patient was admitted with asthma exacerbation, he has not needed supplemental O2 to maintain O2 saturation above 92 overnight, he states that his cough still persists, his shortness of breath and his chest tightness have however improved. He denies any fever, nausea or vomiting. He states that he is tolerating PO intake of regular diet.    Vital Signs   T(F): 98 (04 Jun 2018 04:14), Max: 98.2 (03 Jun 2018 16:38)  HR: 72 (04 Jun 2018 04:14) (64 - 96)  BP: 111/54 (04 Jun 2018 04:14) (111/54 - 141/76)  RR: 18 (04 Jun 2018 04:14) (17 - 24)  SpO2: 100% (04 Jun 2018 04:14) (96% - 100%)        General: Patient found resting in bed, in no acute distress.  HEENT: Head is normocephalic, atraumatic, Eyes: EOMI, clear conjunctivae and sclerae, Nose: No nasal discharge. Ears: there is impacted cerumen partially obstructing ear canal bilaterally, tympanic membrane is pearly bilaterally, no bulging, no perforation. Throat: Absence of tonsils is noted, no pharyngeal erythema or exudates.  Neck: Supple  Respiratory: Normal respiratory rate and effort, no use of accessory muscles, slight expiratory wheezes much improved from yesterday   Cardiac: Regular rate and rhythm, soft S1 and S2, no murmurs, rubs or gallops.  GI: abdomen is flat, soft, nontender, nondistended.  Extremities: No cyanosis or edema,   Neuro: Alert and oriented x3    MEDICATIONS  (STANDING):  ALBUTerol/ipratropium for Nebulization 3 milliLiter(s) Nebulizer every 6 hours  fluticasone propionate 50 MICROgram(s)/spray Nasal Spray 1 Spray(s) Both Nostrils two times a day  methylPREDNISolone sodium succinate Injectable 40 milliGRAM(s) IV Push every 12 hours  sodium chloride 0.9% lock flush 3 milliLiter(s) IV Push every 8 hours    MEDICATIONS  (PRN):  ALBUTerol    0.083% 2.5 milliGRAM(s) Nebulizer every 4 hours PRN Shortness of Breath

## 2018-06-04 NOTE — DISCHARGE NOTE ADULT - CARE PROVIDERS DIRECT ADDRESSES
,DirectAddress_Unknown ,DirectAddress_Unknown,[email or web address]

## 2018-06-04 NOTE — PROGRESS NOTE ADULT - SUBJECTIVE AND OBJECTIVE BOX
PULMONARY PROGRESS NOTE      LAKESHA BRIGHT  MRN-[id number]    Patient is a 22y old  Male who presents with a chief complaint of shortness of breath (03 Jun 2018 12:35)      INTERVAL HPI/OVERNIGHT EVENTS:    Patient is awake and alert  Feels much better with reduced SOB and cough    MEDICATIONS  (STANDING):  ALBUTerol    90 MICROgram(s) HFA Inhaler 1 Puff(s) Inhalation every 4 hours  ALBUTerol/ipratropium for Nebulization 3 milliLiter(s) Nebulizer every 4 hours  methylPREDNISolone sodium succinate Injectable 40 milliGRAM(s) IV Push every 6 hours  montelukast 10 milliGRAM(s) Oral at bedtime  pantoprazole    Tablet 40 milliGRAM(s) Oral before breakfast  tiotropium 18 MICROgram(s) Capsule 1 Capsule(s) Inhalation daily      MEDICATIONS  (PRN):      Allergies    No Known Drug Allergies  peanuts (Other)    Intolerances        PAST MEDICAL & SURGICAL HISTORY:  Asthma  S/P wrist surgery: right wrist at age 13.  S/P tonsillectomy and adenoidectomy: at age 9        REVIEW OF SYSTEMS:    CONSTITUTIONAL:  No distress    HEENT:  Eyes:  No diplopia or blurred vision. ENT:  No earache, sore throat or runny nose.    CARDIOVASCULAR:  No pressure, squeezing, tightness, heaviness or aching about the chest; no palpitations.    RESPIRATORY:  Improved cough, shortness of breath, no PND or orthopnea. Mild SOBOE    GASTROINTESTINAL:  No nausea, vomiting or diarrhea.    GENITOURINARY:  No dysuria, frequency or urgency.    NEUROLOGIC:  No paresthesias, fasciculations, seizures or weakness.    PSYCHIATRIC:  No disorder of thought or mood.    Vital Signs Last 24 Hrs  T(C): 36.7 (04 Jun 2018 04:14), Max: 36.8 (03 Jun 2018 16:38)  T(F): 98 (04 Jun 2018 04:14), Max: 98.2 (03 Jun 2018 16:38)  HR: 64 (04 Jun 2018 12:12) (64 - 78)  BP: 111/54 (04 Jun 2018 04:14) (111/54 - 141/76)  BP(mean): --  RR: 18 (04 Jun 2018 04:14) (17 - 20)  SpO2: 95% (04 Jun 2018 07:46) (95% - 100%)    PHYSICAL EXAMINATION:    GENERAL: The patient is awake and alert in no apparent distress.     HEENT: Head is normocephalic and atraumatic. Extraocular muscles are intact. Mucous membranes are moist.    NECK: Supple.    LUNGS: Clear to auscultation without wheezing, rales or rhonchi; respirations unlabored    HEART: Regular rate and rhythm without murmur.    ABDOMEN: Soft, nontender, and nondistended.      EXTREMITIES: Without any cyanosis, clubbing, rash, lesions or edema.    NEUROLOGIC: Grossly intact.    LABS:                        15.9   5.5   )-----------( 219      ( 03 Jun 2018 14:18 )             46.3     06-03    139  |  104  |  15.0  ----------------------------<  127<H>  4.1   |  20.0<L>  |  0.78    Ca    9.5      03 Jun 2018 14:18  Phos  3.0     06-03  Mg     2.4     06-03    TPro  7.6  /  Alb  4.8  /  TBili  0.3<L>  /  DBili  x   /  AST  25  /  ALT  19  /  AlkPhos  87  06-03      MICROBIOLOGY:    Rapid Respiratory Viral Panel (06.03.18 @ 15:51)    Rapid RVP Result: NotDete: The [employer] RVP Rapid uses polymerase chain reaction (PCR) and melt  curve analysis to screen for adenovirus; coronavirus HKU1, NL63, 229E,  OC43; human metapneumovirus (hMPV); human enterovirus/rhinovirus  (Entero/RV); influenza A; influenza A/H1;influenza A/H3; influenza  A/H1-2009; influenza B; parainfluenza viruses 1, 2, 3, 4; respiratory  syncytial virus; Bordetella pertussis; Mycoplasma pneumoniae; and  Chlamydophila pneumoniae.        RADIOLOGY & ADDITIONAL STUDIES:     EXAM:  XR CHEST PA LAT 2V                          PROCEDURE DATE:  06/03/2018          INTERPRETATION:      Chest radiographs        CLINICAL INFORMATION:  3 days of  Short of breath.     TECHNIQUE:  Frontal and lateral views of the chest were obtained.    FINDINGS:  1/22/2018 available for review.    The lungs are clear.  No pleural abnormality is seen.      The heart and mediastinum appear intact.           IMPRESSION:  No evidence of active chest disease.             NIC WAGONER M.D., ATTENDING RADIOLOGIST  This document has been electronically signed. Barney  3 2018  1:44PM

## 2018-06-04 NOTE — PROGRESS NOTE ADULT - PROBLEM SELECTOR PLAN 1
-IV Methylprednisolone decreased to qday given clinical improvement.  -Nebulizations PRN  -RVP negative  -Pulmonary consult appreciated.  -Counseling about smoking cessation, will provide Nicotine patch if requested.  -Discharge home today, Prednisone taper.  -Follow up with pulmonology and PCP as outpatient. -clinically improved, talking in full sentences, ambulating with no complications  -IV Methylprednisolone decreased to qday given clinical improvement. will transition to po   -Nebulizations PRN  -RVP negative  -Pulmonary consult appreciated.  -Counseling about smoking cessation, will provide Nicotine patch if requested.  -Discharge home today, Prednisone taper.  -Follow up with pulmonology and PCP as outpatient.

## 2018-06-04 NOTE — DISCHARGE NOTE ADULT - OTHER SIGNIFICANT FINDINGS
15.9   5.5   )-----------( 219      ( 03 Jun 2018 14:18 )             46.3   06-03    139  |  104  |  15.0  ----------------------------<  127<H>  4.1   |  20.0<L>  |  0.78    Ca    9.5      03 Jun 2018 14:18  Phos  3.0     06-03  Mg     2.4     06-03  TPro  7.6  /  Alb  4.8  /  TBili  0.3<L>  /  DBili  x   /  AST  25  /  ALT  19  /  AlkPhos  87  06-03    Rapid RVP Result: NotDetec (06.03.18 @ 15:51)    < from: Xray Chest 2 Views PA/Lat (06.03.18 @ 13:36) >  FINDINGS:  1/22/2018 available for review.  The lungs are clear.  No pleural abnormality is seen.    The heart and mediastinum appear intact.         IMPRESSION:  No evidence of active chest disease.         < end of copied text >

## 2018-06-04 NOTE — DISCHARGE NOTE ADULT - ADDITIONAL INSTRUCTIONS
- Follow up with Primary Care Doctor in 2-3 days - Follow up with Primary Care Doctor in 2-3 days. Please follow up with your pulmonologist in 1-2 weeks.

## 2018-06-04 NOTE — DISCHARGE NOTE ADULT - PROVIDER TOKENS
FREE:[LAST:[Louis Vazquez],FIRST:[Guanako SAAVEDRA); Family Medicine],PHONE:[phone]],FAX:[phone]],ADDRESS:[[place] [address]]] FREE:[LAST:[Louis Vazquez],FIRST:[Guanako SAAVEDRA); Family Medicine],PHONE:[phone]],FAX:[phone]],ADDRESS:[[facility] [place] [address]]],TOKEN:'[id number]'

## 2018-06-04 NOTE — DISCHARGE NOTE ADULT - CARE PROVIDER_API CALL
Guanako Rosado); [facility] [place] [address]  Phone: [phone]  Fax: [phone] Guanako Rosado);  at [place]  [address] [place] [address]  Phone: [phone]  Fax: [phone]    Severiano Costello), Critical Care Medicine; Internal Medicine; Pulmonary Disease  [address] [facility]  [address]  [place] [address]  Phone: [phone]  Fax: [phone]

## 2018-06-04 NOTE — DISCHARGE NOTE ADULT - MEDICATION SUMMARY - MEDICATIONS TO TAKE
I will START or STAY ON the medications listed below when I get home from the hospital:    predniSONE 10 mg oral tablet  -- 4 tabs by mouth [id number] x 2 days  3 tabs by mouth [id number] x 2 days  2 tabs by mouth [id number] x 2 days  1 tab by mouth [id number] x 2 days  -- It is very important that you take or use this exactly as directed.  Do not skip doses or discontinue unless directed by your doctor.  Obtain medical advice before taking any non-prescription drugs as some may affect the action of this medication.  Take with food or milk.    -- Indication: For Asthma exacerbation    budesonide-formoterol 80 mcg-4.5 mcg/inh inhalation aerosol  -- 2 puff(s) inhaled 2 times a day   -- Check with your doctor before becoming pregnant.  For inhalation only.  Rinse mouth thoroughly after use.    -- Indication: For Asthma long term control    ipratropium-albuterol 20 mcg-100 mcg/inh inhalation aerosol  -- 2 puff(s) inhaled every 6 hours   -- Indication: For Asthma long term control    albuterol 90 mcg/inh inhalation aerosol  -- 2 puff(s) inhaled 4 times a day, As Needed -for shortness of breath and/or wheezing   -- Indication: For Shortness of breath due to asthma    montelukast 10 mg oral tablet  -- 1 tab(s) by mouth once a day (at bedtime)  -- Indication: For Asthma long term control I will START or STAY ON the medications listed below when I get home from the hospital:    predniSONE 10 mg oral tablet  -- 4 tabs by mouth [id number] x 2 days  3 tabs by mouth [id number] x 2 days  2 tabs by mouth [id number] x 2 days  1 tab by mouth [id number] x 2 days  -- It is very important that you take or use this exactly as directed.  Do not skip doses or discontinue unless directed by your doctor.  Obtain medical advice before taking any non-prescription drugs as some may affect the action of this medication.  Take with food or milk.    -- Indication: For Asthma exacerbation     albuterol 90 mcg/inh inhalation aerosol  -- 2 puff(s) inhaled 4 times a day, As Needed -for shortness of breath and/or wheezing   -- Indication: For Asthma exacerbation     ipratropium-albuterol 0.5 mg-2.5 mg/3 mLinhalation solution  -- 3 milliliter(s) inhaled every 4 hours, As Needed   -- Indication: For Asthma exacerbation     budesonide-formoterol 80 mcg-4.5 mcg/inh inhalation aerosol  -- 2 puff(s) inhaled 2 times a day   -- Check with your doctor before becoming pregnant.  For inhalation only.  Rinse mouth thoroughly after use.    -- Indication: For Asthma exacerbation     montelukast 10 mg oral tablet  -- 1 tab(s) by mouth once a day (at bedtime)  -- Indication: For Asthma exacerbation

## 2018-06-04 NOTE — DISCHARGE NOTE ADULT - CARE PLAN
Principal Discharge DX:	Asthma, unspecified asthma severity, unspecified whether complicated, unspecified whether persistent  Goal:	Improved shortness of breath  Assessment and plan of treatment:	Be sure to take all asthma medications as prescribed, it is important that you are consistent and do not miss taking the ones that are meant to be taken daily, even if your breathing already feels well. If you have been prescribed steroids, also be sure to take those are prescribed. Be sure to follow up with your Primary Care Doctor or a Pulmonologist if you have one. If you do not already have an asthma action plan, please discuss establishing one with your outpatient Doctors.

## 2018-06-04 NOTE — DISCHARGE NOTE ADULT - HOSPITAL COURSE
22 years old male patient with PMHx of asthma who presented with progressively worsening shortness of breath, productive cough and chest pain. Given his medical history with poor medication compliance and sick contacts, likely asthma exacerbation secondary to URI, (-) RVP. He improved clinically with IV steroids, PO montelukast and Albuterol and Duoneb nebulizations, at the time of discharge without need for supplemental oxygen, hemodynamically stable, afebrile and tolerating PO intake.    Time spent coordinating this discharge: 45 minutes 22 years old male patient with PMHx of childhood asthma who presented with progressively worsening shortness of breath, productive cough and chest pain. Prior hospitalization in Jan 2018 for similar sx and was recommended to c/w symbicort/ montelukast which he did not take due to social issues, given his medical history with poor medication compliance and sick contacts, likely asthma exacerbation secondary to URI, (-) RVP. He improved clinically with IV steroids, PO montelukast and Albuterol and Duoneb nebulizations, at the time of discharge without need for supplemental oxygen, hemodynamically stable, afebrile, talking in full sentences, ambulating.  Patient transitioned to po steroids and recommended to continue with maintenance control therapy.     Time spent coordinating this discharge: 45 minutes

## 2018-06-04 NOTE — DISCHARGE NOTE ADULT - MEDICATION SUMMARY - MEDICATIONS TO STOP TAKING
I will STOP taking the medications listed below when I get home from the hospital:    albuterol 2.5 mg/3 mL (0.083%) inhalation solution  -- 3 milliliter(s) inhaled every 6 hours, As Needed I will STOP taking the medications listed below when I get home from the hospital:  None

## 2018-06-04 NOTE — DISCHARGE NOTE ADULT - PLAN OF CARE
Improved shortness of breath Be sure to take all asthma medications as prescribed, it is important that you are consistent and do not miss taking the ones that are meant to be taken daily, even if your breathing already feels well. If you have been prescribed steroids, also be sure to take those are prescribed. Be sure to follow up with your Primary Care Doctor or a Pulmonologist if you have one. If you do not already have an asthma action plan, please discuss establishing one with your outpatient Doctors.

## 2018-08-19 ENCOUNTER — TRANSCRIPTION ENCOUNTER (OUTPATIENT)
Age: 23
End: 2018-08-19

## 2018-10-01 ENCOUNTER — OUTPATIENT (OUTPATIENT)
Dept: OUTPATIENT SERVICES | Facility: HOSPITAL | Age: 23
LOS: 1 days | End: 2018-10-01

## 2018-10-01 DIAGNOSIS — Z98.890 OTHER SPECIFIED POSTPROCEDURAL STATES: Chronic | ICD-10-CM

## 2018-10-01 DIAGNOSIS — Z90.89 ACQUIRED ABSENCE OF OTHER ORGANS: Chronic | ICD-10-CM

## 2018-10-19 DIAGNOSIS — Z71.89 OTHER SPECIFIED COUNSELING: ICD-10-CM

## 2018-12-19 ENCOUNTER — EMERGENCY (EMERGENCY)
Facility: HOSPITAL | Age: 23
LOS: 1 days | Discharge: HOME | End: 2018-12-19
Attending: EMERGENCY MEDICINE
Payer: MEDICAID

## 2018-12-19 VITALS
HEIGHT: 72 IN | HEART RATE: 107 BPM | OXYGEN SATURATION: 100 % | RESPIRATION RATE: 22 BRPM | WEIGHT: 164.91 LBS | SYSTOLIC BLOOD PRESSURE: 111 MMHG | TEMPERATURE: 97 F | DIASTOLIC BLOOD PRESSURE: 71 MMHG

## 2018-12-19 DIAGNOSIS — Z90.89 ACQUIRED ABSENCE OF OTHER ORGANS: Chronic | ICD-10-CM

## 2018-12-19 DIAGNOSIS — Z98.890 OTHER SPECIFIED POSTPROCEDURAL STATES: Chronic | ICD-10-CM

## 2018-12-19 PROCEDURE — 96374 THER/PROPH/DIAG INJ IV PUSH: CPT

## 2018-12-19 PROCEDURE — 96375 TX/PRO/DX INJ NEW DRUG ADDON: CPT

## 2018-12-19 PROCEDURE — 99284 EMERGENCY DEPT VISIT MOD MDM: CPT

## 2018-12-19 PROCEDURE — 96372 THER/PROPH/DIAG INJ SC/IM: CPT | Mod: XU

## 2018-12-19 PROCEDURE — 99284 EMERGENCY DEPT VISIT MOD MDM: CPT | Mod: 25

## 2018-12-19 PROCEDURE — 94640 AIRWAY INHALATION TREATMENT: CPT

## 2018-12-19 RX ORDER — IPRATROPIUM/ALBUTEROL SULFATE 18-103MCG
3 AEROSOL WITH ADAPTER (GRAM) INHALATION ONCE
Qty: 0 | Refills: 0 | Status: DISCONTINUED | OUTPATIENT
Start: 2018-12-19 | End: 2018-12-24

## 2018-12-19 RX ORDER — IPRATROPIUM/ALBUTEROL SULFATE 18-103MCG
3 AEROSOL WITH ADAPTER (GRAM) INHALATION ONCE
Qty: 0 | Refills: 0 | Status: COMPLETED | OUTPATIENT
Start: 2018-12-19 | End: 2018-12-19

## 2018-12-19 RX ORDER — SODIUM CHLORIDE 9 MG/ML
3 INJECTION INTRAMUSCULAR; INTRAVENOUS; SUBCUTANEOUS ONCE
Qty: 0 | Refills: 0 | Status: COMPLETED | OUTPATIENT
Start: 2018-12-19 | End: 2018-12-19

## 2018-12-19 RX ORDER — ALBUTEROL 90 UG/1
2 AEROSOL, METERED ORAL
Qty: 1 | Refills: 0 | OUTPATIENT
Start: 2018-12-19 | End: 2018-12-28

## 2018-12-19 RX ORDER — MAGNESIUM SULFATE 500 MG/ML
2 VIAL (ML) INJECTION ONCE
Qty: 0 | Refills: 0 | Status: COMPLETED | OUTPATIENT
Start: 2018-12-19 | End: 2018-12-19

## 2018-12-19 RX ORDER — IPRATROPIUM/ALBUTEROL SULFATE 18-103MCG
3 AEROSOL WITH ADAPTER (GRAM) INHALATION
Qty: 60 | Refills: 0
Start: 2018-12-19 | End: 2018-12-23

## 2018-12-19 RX ADMIN — Medication 50 GRAM(S): at 17:59

## 2018-12-19 RX ADMIN — Medication 0.25 MILLIGRAM(S): at 17:59

## 2018-12-19 RX ADMIN — Medication 3 MILLILITER(S): at 17:59

## 2018-12-19 RX ADMIN — SODIUM CHLORIDE 3 MILLILITER(S): 9 INJECTION INTRAMUSCULAR; INTRAVENOUS; SUBCUTANEOUS at 17:59

## 2018-12-19 RX ADMIN — Medication 125 MILLIGRAM(S): at 17:59

## 2018-12-19 NOTE — ED STATDOCS - MEDICAL DECISION MAKING DETAILS
patient with asthma exacerbation. Will give nebs, steroid, magnesium, terbutaline. Re-assess after treatment.

## 2018-12-19 NOTE — ED STATDOCS - ATTENDING CONTRIBUTION TO CARE
I, Marshall Justice, performed the initial face to face bedside interview with this patient regarding history of present illness, review of symptoms and relevant past medical, social and family history.  I completed an independent physical examination.  I was the initial provider who evaluated this patient. I have signed out the follow up of any pending tests (i.e. labs, radiological studies) to the ACP.  I have communicated the patient’s plan of care and disposition with the ACP.  The history, relevant review of systems, past medical and surgical history, medical decision making, and physical examination was documented by the scribe in my presence and I attest to the accuracy of the documentation.

## 2018-12-19 NOTE — ED STATDOCS - OBJECTIVE STATEMENT
24 y/o M pt with PMHx asthma presents to the ED c/o asthma exacerbation.  Pt reports feeling SOB with associated weakness and chills.  Pt notes recurrent asthma attacks, usually around this time every year when it gets cold out, usually his is admitted to the hospital when this occurs, last admission January 2018.  Pt has an inhaler but it ran out 4 days ago.  He uses albuterol treatments, last treatment PTA today.  Pt was intubated once, when he was 2 y/o.  Pt admits to social smoking.  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  Denies fever, N/V, CP, urinary symptoms, HA.  No further acute complaints at this time. 24 y/o M pt with PMHx asthma presents to the ED c/o asthma exacerbation.  Pt reports feeling SOB with associated weakness and chills.  Pt notes recurrent asthma attacks, usually around this time every year when it gets cold out, usually his is admitted to the hospital when this occurs, last admission January 2018.  Pt has an inhaler but it ran out 4 days ago.  He uses albuterol treatments, last treatment PTA today.  He takes prednisone.  Pt was intubated once, when he was 2 y/o.  Pt admits to social smoking.  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  Denies fever, N/V, CP, urinary symptoms, HA.  No further acute complaints at this time.

## 2018-12-19 NOTE — ED ADULT NURSE NOTE - OBJECTIVE STATEMENT
Pt care assumed 1750. Pt received AOx3 with SOB and difficulty breathing. Pt c/o of increased difficulty breathing since monday. Pt states he ran out of his inhaler. pt educated on plan of care, pt able to successfully teach back plan of care to RN, RN will continue to reeducate pt during hospital stay.

## 2018-12-19 NOTE — ED STATDOCS - PROGRESS NOTE DETAILS
PT already evaluated by intake physician. I spoke to and re-examined the patient and agree with HPI/PE/ROS as noted above. Will follow up plan per intake physician. Spoke to and re-eval patient. Patient states his breathing is significantly improved from onset. He is breathing without accessory muscle use, and able to speak in complete sentences. O2 sat 100% on room air. Wheezing is decreased b/l lung fields. Patient counseled on tobacco cessation extensively. Instructed on importance of refraining from tobacco use with hx asthma. Patient understands risks of tobacco use, especially with asthma.

## 2018-12-19 NOTE — ED ADULT NURSE REASSESSMENT NOTE - NS ED NURSE REASSESS COMMENT FT1
Pt states breathing is better. Pt only has wheezing in left lower lobe of lung. Pt is resting in bed comfortably at this time, no apparent distress noted at this time. pt safety maintained. Pt denies any complaints at this time. pt educated on plan of care, plan of care taught back to RN. plan of care education deemed proficient through successful teach back. will continue to reeducate pt regarding plan of care.

## 2019-01-07 ENCOUNTER — INPATIENT (INPATIENT)
Facility: HOSPITAL | Age: 24
LOS: 1 days | Discharge: HOME | DRG: 202 | End: 2019-01-09
Attending: INTERNAL MEDICINE | Admitting: INTERNAL MEDICINE
Payer: MEDICAID

## 2019-01-07 VITALS — WEIGHT: 150.36 LBS

## 2019-01-07 DIAGNOSIS — Z90.89 ACQUIRED ABSENCE OF OTHER ORGANS: Chronic | ICD-10-CM

## 2019-01-07 DIAGNOSIS — Z98.890 OTHER SPECIFIED POSTPROCEDURAL STATES: Chronic | ICD-10-CM

## 2019-01-07 DIAGNOSIS — J45.902 UNSPECIFIED ASTHMA WITH STATUS ASTHMATICUS: ICD-10-CM

## 2019-01-07 DIAGNOSIS — Z29.9 ENCOUNTER FOR PROPHYLACTIC MEASURES, UNSPECIFIED: ICD-10-CM

## 2019-01-07 DIAGNOSIS — J45.901 UNSPECIFIED ASTHMA WITH (ACUTE) EXACERBATION: ICD-10-CM

## 2019-01-07 LAB
ALBUMIN SERPL ELPH-MCNC: 5 G/DL — SIGNIFICANT CHANGE UP (ref 3.3–5.2)
ALP SERPL-CCNC: 98 U/L — SIGNIFICANT CHANGE UP (ref 40–120)
ALT FLD-CCNC: 19 U/L — SIGNIFICANT CHANGE UP
ANION GAP SERPL CALC-SCNC: 12 MMOL/L — SIGNIFICANT CHANGE UP (ref 5–17)
AST SERPL-CCNC: 25 U/L — SIGNIFICANT CHANGE UP
BASE EXCESS BLDA CALC-SCNC: -2.1 MMOL/L — LOW (ref -2–2)
BILIRUB SERPL-MCNC: 0.3 MG/DL — LOW (ref 0.4–2)
BLOOD GAS COMMENTS ARTERIAL: SIGNIFICANT CHANGE UP
BUN SERPL-MCNC: 9 MG/DL — SIGNIFICANT CHANGE UP (ref 8–20)
CALCIUM SERPL-MCNC: 9 MG/DL — SIGNIFICANT CHANGE UP (ref 8.6–10.2)
CHLORIDE SERPL-SCNC: 105 MMOL/L — SIGNIFICANT CHANGE UP (ref 98–107)
CO2 SERPL-SCNC: 26 MMOL/L — SIGNIFICANT CHANGE UP (ref 22–29)
CREAT SERPL-MCNC: 0.91 MG/DL — SIGNIFICANT CHANGE UP (ref 0.5–1.3)
EOSINOPHIL NFR BLD AUTO: 9 % — HIGH (ref 0–6)
GAS PNL BLDA: SIGNIFICANT CHANGE UP
GLUCOSE SERPL-MCNC: 117 MG/DL — HIGH (ref 70–115)
HCO3 BLDA-SCNC: 22 MMOL/L — SIGNIFICANT CHANGE UP (ref 20–26)
HCT VFR BLD CALC: 44 % — SIGNIFICANT CHANGE UP (ref 42–52)
HGB BLD-MCNC: 15.5 G/DL — SIGNIFICANT CHANGE UP (ref 14–18)
LYMPHOCYTES # BLD AUTO: 17 % — LOW (ref 20–55)
MCHC RBC-ENTMCNC: 27.6 PG — SIGNIFICANT CHANGE UP (ref 27–31)
MCHC RBC-ENTMCNC: 35.2 G/DL — SIGNIFICANT CHANGE UP (ref 32–36)
MCV RBC AUTO: 78.3 FL — LOW (ref 80–94)
MONOCYTES NFR BLD AUTO: 5 % — SIGNIFICANT CHANGE UP (ref 3–10)
NEUTROPHILS NFR BLD AUTO: 69 % — SIGNIFICANT CHANGE UP (ref 37–73)
PCO2 BLDA: 41 MMHG — SIGNIFICANT CHANGE UP (ref 35–45)
PH BLDA: 7.36 — SIGNIFICANT CHANGE UP (ref 7.35–7.45)
PLATELET # BLD AUTO: 301 K/UL — SIGNIFICANT CHANGE UP (ref 150–400)
PO2 BLDA: 68 MMHG — LOW (ref 83–108)
POTASSIUM SERPL-MCNC: 3.4 MMOL/L — LOW (ref 3.5–5.3)
POTASSIUM SERPL-SCNC: 3.4 MMOL/L — LOW (ref 3.5–5.3)
PROT SERPL-MCNC: 7.8 G/DL — SIGNIFICANT CHANGE UP (ref 6.6–8.7)
RAPID RVP RESULT: SIGNIFICANT CHANGE UP
RBC # BLD: 5.62 M/UL — SIGNIFICANT CHANGE UP (ref 4.6–6.2)
RBC # FLD: 13.1 % — SIGNIFICANT CHANGE UP (ref 11–15.6)
SAO2 % BLDA: 94 % — LOW (ref 95–99)
SODIUM SERPL-SCNC: 143 MMOL/L — SIGNIFICANT CHANGE UP (ref 135–145)
WBC # BLD: 15.2 K/UL — HIGH (ref 4.8–10.8)
WBC # FLD AUTO: 15.2 K/UL — HIGH (ref 4.8–10.8)

## 2019-01-07 PROCEDURE — 71045 X-RAY EXAM CHEST 1 VIEW: CPT | Mod: 26

## 2019-01-07 PROCEDURE — 99223 1ST HOSP IP/OBS HIGH 75: CPT | Mod: GC

## 2019-01-07 PROCEDURE — 99291 CRITICAL CARE FIRST HOUR: CPT

## 2019-01-07 RX ORDER — IPRATROPIUM/ALBUTEROL SULFATE 18-103MCG
3 AEROSOL WITH ADAPTER (GRAM) INHALATION ONCE
Qty: 0 | Refills: 0 | Status: COMPLETED | OUTPATIENT
Start: 2019-01-07 | End: 2019-01-07

## 2019-01-07 RX ORDER — EPINEPHRINE 0.3 MG/.3ML
0.3 INJECTION INTRAMUSCULAR; SUBCUTANEOUS ONCE
Qty: 0 | Refills: 0 | Status: COMPLETED | OUTPATIENT
Start: 2019-01-07 | End: 2019-01-07

## 2019-01-07 RX ORDER — ALBUTEROL 90 UG/1
3 AEROSOL, METERED ORAL
Qty: 0 | Refills: 0 | COMMUNITY

## 2019-01-07 RX ORDER — MAGNESIUM SULFATE 500 MG/ML
2 VIAL (ML) INJECTION ONCE
Qty: 0 | Refills: 0 | Status: COMPLETED | OUTPATIENT
Start: 2019-01-07 | End: 2019-01-07

## 2019-01-07 RX ORDER — ONDANSETRON 8 MG/1
4 TABLET, FILM COATED ORAL ONCE
Qty: 0 | Refills: 0 | Status: COMPLETED | OUTPATIENT
Start: 2019-01-07 | End: 2019-01-07

## 2019-01-07 RX ORDER — ACETAMINOPHEN 500 MG
1000 TABLET ORAL ONCE
Qty: 0 | Refills: 0 | Status: COMPLETED | OUTPATIENT
Start: 2019-01-07 | End: 2019-01-07

## 2019-01-07 RX ORDER — IPRATROPIUM/ALBUTEROL SULFATE 18-103MCG
3 AEROSOL WITH ADAPTER (GRAM) INHALATION EVERY 4 HOURS
Qty: 0 | Refills: 0 | Status: DISCONTINUED | OUTPATIENT
Start: 2019-01-07 | End: 2019-01-09

## 2019-01-07 RX ORDER — ALBUTEROL 90 UG/1
2.5 AEROSOL, METERED ORAL
Qty: 0 | Refills: 0 | Status: DISCONTINUED | OUTPATIENT
Start: 2019-01-07 | End: 2019-01-09

## 2019-01-07 RX ORDER — POTASSIUM CHLORIDE 20 MEQ
40 PACKET (EA) ORAL ONCE
Qty: 0 | Refills: 0 | Status: COMPLETED | OUTPATIENT
Start: 2019-01-07 | End: 2019-01-07

## 2019-01-07 RX ADMIN — Medication 3 MILLILITER(S): at 18:22

## 2019-01-07 RX ADMIN — ONDANSETRON 4 MILLIGRAM(S): 8 TABLET, FILM COATED ORAL at 15:40

## 2019-01-07 RX ADMIN — Medication 3 MILLILITER(S): at 18:51

## 2019-01-07 RX ADMIN — Medication 1000 MILLIGRAM(S): at 17:55

## 2019-01-07 RX ADMIN — Medication 400 MILLIGRAM(S): at 15:40

## 2019-01-07 RX ADMIN — EPINEPHRINE 0.3 MILLIGRAM(S): 0.3 INJECTION INTRAMUSCULAR; SUBCUTANEOUS at 15:40

## 2019-01-07 RX ADMIN — Medication 3 MILLILITER(S): at 22:45

## 2019-01-07 RX ADMIN — Medication 50 GRAM(S): at 22:44

## 2019-01-07 RX ADMIN — Medication 40 MILLIEQUIVALENT(S): at 21:45

## 2019-01-07 RX ADMIN — Medication 125 MILLIGRAM(S): at 21:45

## 2019-01-07 RX ADMIN — Medication 3 MILLILITER(S): at 21:46

## 2019-01-07 RX ADMIN — Medication 1000 MILLIGRAM(S): at 15:55

## 2019-01-07 NOTE — H&P ADULT - ASSESSMENT
22 y/o M with PMH of uncontrolled asthma, intubated when he was 1 years old, previous hospitalizations due to asthma. 22 y/o M with PMH of uncontrolled asthma, intubated when he was 1 years old, previous hospitalizations due to asthma who presents with shortness of breath, audible wheezing and accessory respiratory muscle in the setting of poorly controlled asthma. Likely asthma exacerbation. Patient with adequate initial response to albuterol and epinephrin but still symptomatic. Will admit for acute asthma exacerbation      Admit to medical residents team with Dr Woodard/Dr Hall, [facility]            Persistent asthma with acute exacerbation, severe   S/p 3 duo-nebs in the Ed. Will c/o with Duo-nebs Q 4hrs  Start Albuterol nebs Q 2hrs PRN  Will give Solu-medrol 125 mg IV push then 60 mg Q 6hrs  Will give magnesium sulfate 2g IVPB  Oxygen therapy as needed. May consider BiPAP      Prophylactic measure  SCD boots, ambulate as tolerated 22 y/o M with PMH of uncontrolled asthma, intubated when he was 1 years old, previous hospitalizations due to asthma who presents with shortness of breath, audible wheezing and accessory respiratory muscle in the setting of poorly controlled asthma. Likely asthma exacerbation. Patient with adequate initial response to albuterol and epinephrin but still symptomatic. Will admit for acute asthma exacerbation      Admit to medical residents team with Dr Woodard/Dr Hall, 4 [facility]            Persistent asthma with acute exacerbation, severe   S/p 3 duo-nebs in the Ed. Will c/o with Duo-nebs Q 4hrs  Start Albuterol nebs Q 2hrs PRN  Will give Solu-medrol 125 mg IV push then 60 mg Q 6hrs  Will give magnesium sulfate 2g IVPB  Oxygen therapy as needed. May consider BiPAP  MICU consult called  VS Q 2 hrs    Prophylactic measure  SCD boots, ambulate as tolerated 24 y/o M with PMH of uncontrolled asthma, intubated when he was 1 years old, previous hospitalizations due to asthma who presents with shortness of breath, audible wheezing and accessory respiratory muscle in the setting of poorly controlled asthma. Likely asthma exacerbation. Patient with adequate initial response to albuterol and epinephrin but still symptomatic. Will admit for acute asthma exacerbation      Admit to medical residents team with Dr Woodard/Dr Hall, 4 [facility]  Diet: regular  VS: Q 2 rhs  Activity: as tolerated      Persistent asthma with acute exacerbation, severe   S/p 3 duo-nebs in the Ed. Will c/o with Duo-nebs Q 4hrs  Start Albuterol nebs Q 2hrs PRN  Will give Solu-medrol 125 mg IV push then 60 mg Q 6hrs  Will give magnesium sulfate 2g IVPB  Oxygen therapy as needed. May consider BiPAP  MICU consult called  VS Q 2 hrs  C/w heliox therapy ordered in the ED  D/w SROC and nocturnist attending    Prophylactic measure  SCD boots, ambulate as tolerated 22 y/o M with PMH of uncontrolled asthma, intubated when he was 1 years old, previous hospitalizations due to asthma who presents with shortness of breath, audible wheezing and accessory respiratory muscle in the setting of poorly controlled asthma. Likely asthma exacerbation. Patient with adequate initial response to albuterol and epinephrin but still symptomatic. Will admit for acute asthma exacerbation      Admit to medical residents team with Dr Woodard/Dr Hall, 4 [facility]  Diet: regular  VS: Q 2 rhs  Activity: as tolerated      Status asthmaticus  S/p 3 duo-nebs in the Ed. Will c/o with Duo-nebs Q 4hrs  Start Albuterol nebs Q 2hrs PRN  Will give Solu-medrol 125 mg IV push then 60 mg Q 6hrs  Will give magnesium sulfate 2g IVPB  Oxygen therapy as needed. May consider BiPAP  MICU consult called  VS Q 2 hrs  C/w heliox therapy ordered in the ED  D/w SROC and nocturnist attending    Prophylactic measure  SCD boots, ambulate as tolerated 22 y/o M with PMH of uncontrolled asthma, intubated when he was 1 years old, previous hospitalizations due to asthma who presents with shortness of breath, audible wheezing and accessory respiratory muscle in the setting of poorly controlled asthma. Likely asthma exacerbation. Patient with adequate initial response to albuterol and epinephrin but still symptomatic. Will admit for acute asthma exacerbation      Admit to medical residents team with Dr Woodard/Dr Hall, 4 [employer]  Diet: regular  VS: Q 2 rhs  Activity: as tolerated      Status asthmaticus with acute hypoxemic respiratory failure  S/p 3 duo-nebs in the Ed. Will c/o with Duo-nebs Q 4hrs  Start Albuterol nebs Q 2hrs PRN  Will give Solu-medrol 125 mg IV push then 60 mg Q 6hrs  Will give magnesium sulfate 2g IVPB  Oxygen therapy as needed. May consider BiPAP  MICU consult called  VS Q 2 hrs  C/w heliox therapy ordered in the ED  D/w SROC and nocturnist attending    Prophylactic measure  SCD boots, ambulate as tolerated

## 2019-01-07 NOTE — ED PROVIDER NOTE - CRITICAL CARE PROVIDED
direct patient care (not related to procedure)/additional history taking/documentation/consult w/ pt's family directly relating to pts condition/interpretation of diagnostic studies/consultation with other physicians/60 MINUTES

## 2019-01-07 NOTE — ED ADULT NURSE REASSESSMENT NOTE - NS ED NURSE REASSESS COMMENT FT1
pt stable throughout the day. satting 97% on heliox mask. Unlabored breathing after admin of duonebs. Pt comfortable resting in bed with family at bedside.

## 2019-01-07 NOTE — CONSULT NOTE ADULT - ATTENDING COMMENTS
Pt seen and evaluated by [facility] PA, I have seen and evaluated this patient independently and agree with the above findings except as follows: 23 year old AAM with life long asthma "ran out of inhaler" several days ago presents to ER after dust exposure with wheeze, SOB. Pt tx with nebs, steroids, mag, heliox and remains with diffuse wheeze on exam but is comfortable breathing at a rate of 18 and able to speak in full sentences no use of accessory muscles. + air entry, adequate excursion. PT placed on BIPAP= averaging Vt 600-800. Recommend  trial off BIPAP, cont BDs, Steroids. PT does not require ICU LOC. Please reconsult PRN.

## 2019-01-07 NOTE — ED ADULT TRIAGE NOTE - CHIEF COMPLAINT QUOTE
Patient BIBA, severe respiratory distress, hx of asthma and previous intubations, sent to critical care and respiratory called

## 2019-01-07 NOTE — H&P ADULT - HISTORY OF PRESENT ILLNESS
24 y/o M with PMH of uncontrolled asthma, intubated when he was 1 years old, previous hospitalizations due to asthma. He mentions that he normally uses his albuterol inhaler Q 6hrs until 1 month ago to the point that yesterday he would required Q 2hrs. He also has been using his albuterol inhaler more often. He has also been coughing some yellowish phlegm but denies hemoptysis. He was in the ED 3 weeks ago with same complaints but was not admitted. He was discharfged with a short steroid course and told to follow up at the clinic. He denies fevers, sick contacts, recent travels. He works at [employer] in the [employer] department. He admits to have cough, audible wheezing and to use his nebulizer and inhaler every day and that his symptoms wakes him up at night.  He says that he would go to his outpatient appointments his next clinic appointment is in february. 22 y/o M with PMH of uncontrolled asthma, intubated when he was 1 years old, previous hospitalizations due to asthma. He mentions that he normally uses his albuterol inhaler Q 6hrs until 1 month ago to the point that yesterday he would required Q 2hrs. He also has been using his albuterol inhaler more often. He has also been coughing some yellowish phlegm but denies hemoptysis. He was in the ED 3 weeks ago with same complaints but was not admitted. He was discharged with a short steroid course and told to follow up at the clinic. He denies fevers, sick contacts, recent travels. He works at [employer] in the [employer] department. He admits to have cough, audible wheezing and to use his nebulizer and inhaler every day and that his symptoms wakes him up at night.  He says that he would go to his outpatient appointments his next clinic appointment is in february.  Pt is supposed to be on maintenance inhalers but hasn't used any in several months because 'he has a lot of issues going on'.  He has been using his rescue inhalers daily as above.

## 2019-01-07 NOTE — ED ADULT NURSE REASSESSMENT NOTE - NS ED NURSE REASSESS COMMENT FT1
assumed care of patient at 2000, alert and oriented x4, pt currently sitting up in stretcher, on helium 02. Pt reports he is feeling much better then he did prior to coming ot ED. DEnies chest pain, c/o SOB with exertion and slight SOB at rest. Respirations even slightly labored, scattered wheezes heard bilaterally. Pt currently awaiting bed assignment, safety maintained, will continue to monitor.

## 2019-01-07 NOTE — CHART NOTE - NSCHARTNOTEFT_GEN_A_CORE
Pt placed on Heliox 70/30 via treatment first, then placed on simple mask.  O2 sat 99%, pt breathing better.  will continue to monitor

## 2019-01-07 NOTE — CONSULT NOTE ADULT - SUBJECTIVE AND OBJECTIVE BOX
Patient is a 23y old  Male who presents with a chief complaint of Shortness of breath (07 Jan 2019 21:00)    24 y/o male pmhx Asthma presented to ED w/ 1 day of SOB, wheezing. He has had multiple hospitalizations for his asthma in the past and was intubated 1x when he was 1 years old. Pt states he has been using his home albuterol and nebs more frequently over the past week. 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 He denies any recent sick contacts, no fever/chills, URI symptoms, cough, chest pain. He states his SOB was gotten better sine he arrived. In ED patient sitting comfortably in bed on heliox eating [employer] w/out any significant work of breathing. O2 sat 96%, RR 22-26. tachycardic from 106-120. Able to speak in complete sentences.       PAST MEDICAL & SURGICAL HISTORY:  Asthma  S/P wrist surgery: right wrist at age 13.  S/P tonsillectomy and adenoidectomy: at age 9    Allergies    No Known Drug Allergies  peanuts (Other)    Intolerances      FAMILY HISTORY:  Family history of bipolar disorder (Mother)  Family history of asthma (Father, Mother)      Review of Systems:  CONSTITUTIONAL: No fever, chills, or fatigue  EYES: No eye pain, visual disturbances, or discharge  ENMT:  No difficulty hearing, tinnitus, vertigo; No sinus or throat pain  NECK: No pain or stiffness  RESPIRATORY: No cough,chills or hemoptysis; +  shortness of breath and wheezing   CARDIOVASCULAR: No chest pain, palpitations, dizziness, or leg swelling  GASTROINTESTINAL: No abdominal or epigastric pain. No nausea, vomiting, or hematemesis; No diarrhea or constipation. No melena or hematochezia.  GENITOURINARY: No dysuria, frequency, hematuria, or incontinence  NEUROLOGICAL: No headaches, memory loss, loss of strength, numbness, or tremors  SKIN: No itching, burning, rashes, or lesions   MUSCULOSKELETAL: No joint pain or swelling; No muscle, back, or extremity pain  PSYCHIATRIC: No depression, anxiety, mood swings, or difficulty sleeping      Medications:      ALBUTerol    0.083% 2.5 milliGRAM(s) Nebulizer every 2 hours PRN  ALBUTerol/ipratropium for Nebulization 3 milliLiter(s) Nebulizer every 4 hours  magnesium sulfate  IVPB 2 Gram(s) IV Intermittent once                ICU Vital Signs Last 24 Hrs  T(C): 37 (07 Jan 2019 19:28), Max: 37 (07 Jan 2019 19:28)  T(F): 98.6 (07 Jan 2019 19:28), Max: 98.6 (07 Jan 2019 19:28)  HR: 118 (07 Jan 2019 19:28) (113 - 118)  BP: 118/68 (07 Jan 2019 19:28) (118/68 - 176/90)  RR: 20 (07 Jan 2019 19:28) (20 - 26)  SpO2: 99% (07 Jan 2019 19:28) (96% - 99%)    Vital Signs Last 24 Hrs  T(C): 37 (07 Jan 2019 19:28), Max: 37 (07 Jan 2019 19:28)  T(F): 98.6 (07 Jan 2019 19:28), Max: 98.6 (07 Jan 2019 19:28)  HR: 118 (07 Jan 2019 19:28) (113 - 118)  BP: 118/68 (07 Jan 2019 19:28) (118/68 - 176/90)  BP(mean): --  RR: 20 (07 Jan 2019 19:28) (20 - 26)  SpO2: 99% (07 Jan 2019 19:28) (96% - 99%)    ABG - ( 07 Jan 2019 21:00 )  pH, Arterial: 7.36  pH, Blood: x     /  pCO2: 41    /  pO2: 68    / HCO3: 22    / Base Excess: -2.1  /  SaO2: 94                  I&O's Detail        LABS:                        15.5   15.2  )-----------( 301      ( 07 Jan 2019 15:43 )             44.0     01-07    143  |  105  |  9.0  ----------------------------<  117<H>  3.4<L>   |  26.0  |  0.91    Ca    9.0      07 Jan 2019 15:43    TPro  7.8  /  Alb  5.0  /  TBili  0.3<L>  /  DBili  x   /  AST  25  /  ALT  19  /  AlkPhos  98  01-07          CAPILLARY BLOOD GLUCOSE            CULTURES:      Physical Examination:    General: AAOx3. Mild respiratory distress     HEENT: Pupils equal, reactive to light.  Symmetric.    PULM:  b/l inspiratory and end expiratory wheeze. No rhonchi/ rales.     CVS: + S1/S2. Sinus tachycardia. No murmurs     ABD: Soft, nondistended, nontender, normoactive bowel sounds, no masses    EXT: No edema, nontender    SKIN: Warm and well perfused, no rashes noted.    NEURO: A&Ox3, strength 5/5 all extremities no focal deficits    RADIOLOGY: < from: Xray Chest 1 View-PORTABLE IMMEDIATE (01.07.19 @ 16:08) >     EXAM:  XR CHEST PORTABLE IMMED 1V                          PROCEDURE DATE:  01/07/2019          INTERPRETATION:  History: Dyspnea    Technique:  PA and lateral views    Comparisons:  Chest x-ray dated 6/3/2018    Findings:     No acute infiltrates, congestion or pleural effusions  .      The pulmonary vasculature and aorta are normal for age. Heart size is   unremarkable.     The thorax is normal for age.    Impression: No acute pulmonary disease.     < end of copied text >      CRITICAL CARE TIME SPENT: 32 min  Evaluating/treating patient, reviewing data/labs/imaging, discussing case with multidisciplinary team, discussing plan/goals of care with patient/family. Non-inclusive of procedure time. Patient is a 23y old  Male who presents with a chief complaint of Shortness of breath (07 Jan 2019 21:00)    24 y/o male pmhx Asthma presented to ED w/ 1 day of SOB, wheezing. He has had multiple hospitalizations for his asthma in the past and was intubated 1x when he was 1 years old. Pt states he has been using his home albuterol and nebs more frequently over the past week. 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 He denies any recent sick contacts, no fever/chills, URI symptoms, cough, chest pain. He states his SOB was gotten better sine he arrived. In ED patient sitting comfortably in bed on heliox eating [employer] w/out any significant work of breathing. O2 sat 96%, RR 22-26. tachycardic from 106-120. Able to speak in complete sentences.       PAST MEDICAL & SURGICAL HISTORY:  Asthma  S/P wrist surgery: right wrist at age 13.  S/P tonsillectomy and adenoidectomy: at age 9    Allergies    No Known Drug Allergies  peanuts (Other)    Intolerances      FAMILY HISTORY:  Family history of bipolar disorder (Mother)  Family history of asthma (Father, Mother)      Review of Systems:  CONSTITUTIONAL: No fever, chills, or fatigue  EYES: No eye pain, visual disturbances, or discharge  ENMT:  No difficulty hearing, tinnitus, vertigo; No sinus or throat pain  NECK: No pain or stiffness  RESPIRATORY: No cough,chills or hemoptysis; +  shortness of breath and wheezing   CARDIOVASCULAR: No chest pain, palpitations, dizziness, or leg swelling  GASTROINTESTINAL: No abdominal or epigastric pain. No nausea, vomiting, or hematemesis; No diarrhea or constipation. No melena or hematochezia.  GENITOURINARY: No dysuria, frequency, hematuria, or incontinence  NEUROLOGICAL: No headaches, memory loss, loss of strength, numbness, or tremors  SKIN: No itching, burning, rashes, or lesions   MUSCULOSKELETAL: No joint pain or swelling; No muscle, back, or extremity pain  PSYCHIATRIC: No depression, anxiety, mood swings, or difficulty sleeping      Medications:      ALBUTerol    0.083% 2.5 milliGRAM(s) Nebulizer every 2 hours PRN  ALBUTerol/ipratropium for Nebulization 3 milliLiter(s) Nebulizer every 4 hours  magnesium sulfate  IVPB 2 Gram(s) IV Intermittent once                ICU Vital Signs Last 24 Hrs  T(C): 37 (07 Jan 2019 19:28), Max: 37 (07 Jan 2019 19:28)  T(F): 98.6 (07 Jan 2019 19:28), Max: 98.6 (07 Jan 2019 19:28)  HR: 118 (07 Jan 2019 19:28) (113 - 118)  BP: 118/68 (07 Jan 2019 19:28) (118/68 - 176/90)  RR: 20 (07 Jan 2019 19:28) (20 - 26)  SpO2: 99% (07 Jan 2019 19:28) (96% - 99%)    Vital Signs Last 24 Hrs  T(C): 37 (07 Jan 2019 19:28), Max: 37 (07 Jan 2019 19:28)  T(F): 98.6 (07 Jan 2019 19:28), Max: 98.6 (07 Jan 2019 19:28)  HR: 118 (07 Jan 2019 19:28) (113 - 118)  BP: 118/68 (07 Jan 2019 19:28) (118/68 - 176/90)  BP(mean): --  RR: 20 (07 Jan 2019 19:28) (20 - 26)  SpO2: 99% (07 Jan 2019 19:28) (96% - 99%)    ABG - ( 07 Jan 2019 21:00 )  pH, Arterial: 7.36  pH, Blood: x     /  pCO2: 41    /  pO2: 68    / HCO3: 22    / Base Excess: -2.1  /  SaO2: 94                  I&O's Detail        LABS:                        15.5   15.2  )-----------( 301      ( 07 Jan 2019 15:43 )             44.0     01-07    143  |  105  |  9.0  ----------------------------<  117<H>  3.4<L>   |  26.0  |  0.91    Ca    9.0      07 Jan 2019 15:43    TPro  7.8  /  Alb  5.0  /  TBili  0.3<L>  /  DBili  x   /  AST  25  /  ALT  19  /  AlkPhos  98  01-07          CAPILLARY BLOOD GLUCOSE            CULTURES:      Physical Examination:    General: AAOx3. Mild respiratory distress     HEENT: Pupils equal, reactive to light.  Symmetric.    PULM:  b/l inspiratory and end expiratory wheeze. No rhonchi/ rales.     CVS: + S1/S2. Sinus tachycardia. No murmurs     ABD: Soft, nondistended, nontender, normoactive bowel sounds, no masses    EXT: No edema, nontender    SKIN: Warm and well perfused, no rashes noted.    NEURO: A&Ox3, strength 5/5 all extremities no focal deficits    RADIOLOGY: < from: Xray Chest 1 View-PORTABLE IMMEDIATE (01.07.19 @ 16:08) >     EXAM:  XR CHEST PORTABLE IMMED 1V                          PROCEDURE DATE:  01/07/2019          INTERPRETATION:  History: Dyspnea    Technique:  PA and lateral views    Comparisons:  Chest x-ray dated 6/3/2018    Findings:     No acute infiltrates, congestion or pleural effusions  .      The pulmonary vasculature and aorta are normal for age. Heart size is   unremarkable.     The thorax is normal for age.    Impression: No acute pulmonary disease.     < end of copied text >       TIME SPENT: 32 min  Evaluating/treating patient, reviewing data/labs/imaging, discussing case with multidisciplinary team, discussing plan/goals of care with patient/family. Non-inclusive of procedure time.

## 2019-01-07 NOTE — H&P ADULT - NSHPLABSRESULTS_GEN_ALL_CORE
CBC Full  -  ( 07 Jan 2019 15:43 )  WBC Count : 15.2 K/uL  Hemoglobin : 15.5 g/dL  Hematocrit : 44.0 %  Platelet Count - Automated : 301 K/uL  Mean Cell Volume : 78.3 fl  Mean Cell Hemoglobin : 27.6 pg  Mean Cell Hemoglobin Concentration : 35.2 g/dL  Auto Neutrophil # : x  Auto Lymphocyte # : x  Auto Monocyte # : x  Auto Eosinophil # : x  Auto Basophil # : x  Auto Neutrophil % : 69.0 %  Auto Lymphocyte % : 17.0 %  Auto Monocyte % : 5.0 %  Auto Eosinophil % : 9.0 %  Auto Basophil % : x      01-07    143  |  105  |  9.0  ----------------------------<  117<H>  3.4<L>   |  26.0  |  0.91  Ca    9.0      07 Jan 2019 15:43  TPro  7.8  /  Alb  5.0  /  TBili  0.3<L>  /  DBili  x   /  AST  25  /  ALT  19  /  AlkPhos  98  01-07      Rapid Respiratory Viral Panel (01.07.19 @ 15:57)    Rapid RVP Result: NotDetec: This Respiratory Panel uses polymerase chain reaction (PCR) to detect for    Blood Gas Profile - Arterial (01.07.19 @ 21:00)    pH, Arterial: 7.36    pCO2, Arterial: 41 mmHg    pO2, Arterial: 68 mmHg    HCO3, Arterial: 22 mmoL/L    Base Excess, Arterial: -2.1 mmol/L    Oxygen Saturation, Arterial: 94 %    Blood Gas Comments Arterial: helium tank    Blood Gas Source Arterial: Arterial    < from: Xray Chest 1 View-PORTABLE IMMEDIATE (01.07.19 @ 16:08) >   EXAM:  XR CHEST PORTABLE IMMED 1V                        PROCEDURE DATE:  01/07/2019    INTERPRETATION:  History: Dyspnea  Technique:  PA and lateral views  Comparisons:  Chest x-ray dated 6/3/2018  Findings:  No acute infiltrates, congestion or pleural effusions  .      The pulmonary vasculature and aorta are normal for age. Heart size is unremarkable.   The thorax is normal for age.  Impression: No acute pulmonary disease.   CALLUM BLANKENSHIP M.D., ATTENDINGRADIOLOGIST  This document has been electronically signed. Jan 7 2019  4:26PM    < end of copied text >

## 2019-01-07 NOTE — CONSULT NOTE ADULT - ASSESSMENT
22 y/o male pmhx Asthma presented to ED w/ 1 day of SOB, wheezing. He has had multiple hospitalizations for his asthma in the past and was intubated 1x when he was 1 years old. Pt admitted w/ respiratory distress related to asthma exacerbation. 24 y/o male pmhx Asthma presented to ED w/ 1 day of SOB, wheezing. He has had multiple hospitalizations for his asthma in the past and was intubated 1x when he was 1 years old. Pt admitted w/ respiratory distress related to asthma exacerbation. At this time patient does not require an ICU admission as he appears comfortable on BiPAP, w/out any significant respiratory distress. Please re consult if his condition begins to deteriorate.     Case discussed w/ ICU attending Dr. Feldman      Plan:  - Admit to Step down  - Continue w/ Steroids 60mg q6hr. S/p 125mg Solumedrol   -Duoneb q6hr and PRN Nebs for wheezing/respiratory distress q2hr   - s/p 2mg Mag.    - May trial off BiPAP tonight. Appears comfortable on 10/5 40%  taking good TV (400-600 ) and RR 18-22.

## 2019-01-07 NOTE — ED PROVIDER NOTE - CONSTITUTIONAL, MLM
normal... Well appearing, well nourished, awake, alert, oriented to person, place, time/situation and in respiratory distress

## 2019-01-07 NOTE — ED PROVIDER NOTE - OBJECTIVE STATEMENT
23M with hx of asthma (intubated at 1 years old) p/w asthma exacerbation. patient states sx worsening since yesterday. (+) cough. used rescue inhaler today w/o improvement. called ems. patient in respiratory distress upon arrival in the ED.

## 2019-01-07 NOTE — ED PROVIDER NOTE - MEDICAL DECISION MAKING DETAILS
23M with asthma exacerbation, resp distress. received albuterol x2 and Mag sulfate ivp, solu-medrol 125mcg with ems. epi sq, nebsm heliox, cxr, zofran.

## 2019-01-07 NOTE — ED ADULT NURSE NOTE - OBJECTIVE STATEMENT
pt stated he had difficulty breathing since yesterday and today it worsened. pt is labored and tachypneic

## 2019-01-07 NOTE — ED PROVIDER NOTE - ATTENDING CONTRIBUTION TO CARE
PT WITH SEVERE ASTHMA EXACERBATION REQUIRING HELIOX AFTER MAG, STEROIDS, IVF, NEBS SQ EPI  NEEDS ADMIT STILL DECREASED AIR MOVEMENT ALTHOUGH ABLE TO SPEAK  + WHEEZING  IS [facility] PT  AGREE WITH HX PE TX DISPO

## 2019-01-07 NOTE — H&P ADULT - ATTENDING COMMENTS
Patient seen and examined.  Case discussed with residents.    Briefly, 23yoM hx moderate to severe persistent asthma being admitted for status asthmaticus in setting of medication non-compliance as he has not used maintenance inhalers in several months.  In ED, pt received multiple rounds of DuoNebs, steroids, heliox therapy and upon my evaluation was still diffusely wheezing with decent air entry on exam but was in moderate respiratory distress and still feeling very symptomatic in terms of his dyspnea.  Checked ABG with revealed normal pH but pCO2 41 which was concerning as pt should have respiratory alkalosis.  Due to concern for respiratory fatigue and persistent symptoms, started pt on BiPAP to help relieve work of breathing.  Repeat ABG ordered to ensure pt not developing hypercapnia and was stable.  MICU consult called for status asthmaticus and pt was not deemed a candidate.  Pt felt better after being started on BIPAP and his respiratory distress appeared improved.  Pt admitted to step down unit, continue with BiPAP, high dose IV steroids, standing DuoNebs and heliox therapy.  Pt will likely required prolonged prednisone taper, should be started on maintenance inhalers and should have outpatient pulmonary follow up. Patient seen and examined.  Case discussed with residents.    Briefly, 23yoM hx moderate to severe persistent asthma being admitted for status asthmaticus in setting of medication non-compliance as he has not used maintenance inhalers in several months.  In ED, pt received multiple rounds of DuoNebs, steroids, heliox therapy and upon my evaluation was still diffusely wheezing with decent air entry on exam but was in moderate respiratory distress and still feeling very symptomatic in terms of his dyspnea.  Checked ABG with revealed normal pH but pCO2 41 which was concerning as pt should have respiratory alkalosis.  Due to concern for respiratory fatigue and persistent symptoms, started pt on BiPAP to help relieve work of breathing.  Repeat ABG ordered to ensure pt not developing hypercapnia and it was stable.  MICU consult called for status asthmaticus and pt was not deemed a candidate.  Pt felt better after being started on BIPAP and his respiratory distress appeared improved.  Pt to be admitted to step down unit, was given Mg, will continue with BiPAP, high dose IV steroids, standing DuoNebs and heliox therapy.  Anticipate pt can be weaned from BiPAP as steroids start to take effect.  Pt will likely required prolonged prednisone taper, should be started on maintenance inhalers and should have outpatient pulmonary follow up. Patient seen and examined.  Case discussed with residents.    Briefly, 23yoM hx moderate to severe persistent asthma being admitted for status asthmaticus and hypoxemic respiratory failure in setting of medication non-compliance as he has not used maintenance inhalers in several months.  In ED, pt received multiple rounds of DuoNebs, steroids, heliox therapy and upon my evaluation was still diffusely wheezing with decent air entry on exam but was in moderate respiratory distress and still feeling very symptomatic in terms of his dyspnea.  Checked ABG with revealed normal pH but pCO2 41 which was concerning as pt should have respiratory alkalosis.  Due to concern for respiratory fatigue and persistent symptoms, started pt on BiPAP to help relieve work of breathing.  Repeat ABG ordered to ensure pt not developing hypercapnia and it was stable.  MICU consult called for status asthmaticus and pt was not deemed a candidate.  Pt felt better after being started on BIPAP and his respiratory distress appeared improved.  Pt to be admitted to step down unit, was given Mg, will continue with BiPAP, high dose IV steroids, standing DuoNebs and heliox therapy.  Anticipate pt can be weaned from BiPAP as steroids start to take effect.  Pt will likely required prolonged prednisone taper, should be started on maintenance inhalers and should have outpatient pulmonary follow up.

## 2019-01-07 NOTE — H&P ADULT - NSHPSOCIALHISTORY_GEN_ALL_CORE
Mentions EtOH, tobacco socially. Denies illicit drug use.  Works at [employer] in the [employer]  Lives with his girlfriend, 5 year old boy

## 2019-01-07 NOTE — H&P ADULT - NSHPPHYSICALEXAM_GEN_ALL_CORE
Vital Signs Last 24 Hrs  T(C): 37 (07 Jan 2019 19:28), Max: 37 (07 Jan 2019 19:28)  T(F): 98.6 (07 Jan 2019 19:28), Max: 98.6 (07 Jan 2019 19:28)  HR: 118 (07 Jan 2019 19:28) (113 - 118)  BP: 118/68 (07 Jan 2019 19:28) (118/68 - 176/90)  RR: 20 (07 Jan 2019 19:28) (20 - 26)  SpO2: 99% (07 Jan 2019 19:28) (96% - 99%) on O2 via non rebreather mask      General: Well nourished/Well developed, NAD  Head:  Normocephalic, atraumatic  ENT:  Mucosa moist, no ulcerations  Neck:  Supple, no sinuses or palpable masses. Mild accessory muscle use  Lymphatic:  No palpable cervical, adenopathy  Respiratory: Generalized inspiratory and expiratory wheezing. No crackles.   CV: RRR, S1S2, no murmur  GI: Abdomen soft, NT, ND no palpable mass  Extremities: No edema, + peripheral pulses, FROM all 4 extremity  Neurology: A&Ox3, no focalization signs Vital Signs Last 24 Hrs  T(C): 37 (07 Jan 2019 19:28), Max: 37 (07 Jan 2019 19:28)  T(F): 98.6 (07 Jan 2019 19:28), Max: 98.6 (07 Jan 2019 19:28)  HR: 118 (07 Jan 2019 19:28) (113 - 118)  BP: 118/68 (07 Jan 2019 19:28) (118/68 - 176/90)  RR: 20 (07 Jan 2019 19:28) (20 - 26)  SpO2: 99% (07 Jan 2019 19:28) (96% - 99%) on O2 via non rebreather mask      General: Well nourished/Well developed, in moderate respiratory distress  Head:  Normocephalic, atraumatic  ENT:  Mucosa moist, no ulcerations  Neck:  Supple, no sinuses or palpable masses.  accessory muscle use  Lymphatic:  No palpable cervical, adenopathy  Respiratory: Generalized inspiratory and expiratory wheezing. No crackles.   CV: RRR, S1S2, no murmur  GI: Abdomen soft, NT, ND no palpable mass  Extremities: No edema, + peripheral pulses, FROM all 4 extremity  Neurology: A&Ox3, no focalization signs

## 2019-01-07 NOTE — ED ADULT NURSE REASSESSMENT NOTE - NS ED NURSE REASSESS COMMENT FT1
MD Woodard at bedside, patient with increased SOB. Audible wheezing heard. IV Magnesium infusing, Duoneb given. Pt to be placed on bipap, respiratory therapist currently at bedside. Bilateral Wheezing heard, SP02 93% on room air. Respirations labored, Pt tachypneic, RR 30. Pt on CM, , sinus tachycardia noted. Will continue to closely monitor.

## 2019-01-08 DIAGNOSIS — R06.03 ACUTE RESPIRATORY DISTRESS: ICD-10-CM

## 2019-01-08 DIAGNOSIS — J45.909 UNSPECIFIED ASTHMA, UNCOMPLICATED: ICD-10-CM

## 2019-01-08 LAB
ANION GAP SERPL CALC-SCNC: 16 MMOL/L — SIGNIFICANT CHANGE UP (ref 5–17)
BASE EXCESS BLDA CALC-SCNC: -1.9 MMOL/L — SIGNIFICANT CHANGE UP (ref -2–2)
BASOPHILS # BLD AUTO: 0 K/UL — SIGNIFICANT CHANGE UP (ref 0–0.2)
BASOPHILS NFR BLD AUTO: 0.1 % — SIGNIFICANT CHANGE UP (ref 0–2)
BLOOD GAS COMMENTS ARTERIAL: SIGNIFICANT CHANGE UP
BUN SERPL-MCNC: 10 MG/DL — SIGNIFICANT CHANGE UP (ref 8–20)
CALCIUM SERPL-MCNC: 10.1 MG/DL — SIGNIFICANT CHANGE UP (ref 8.6–10.2)
CHLORIDE SERPL-SCNC: 105 MMOL/L — SIGNIFICANT CHANGE UP (ref 98–107)
CO2 SERPL-SCNC: 21 MMOL/L — LOW (ref 22–29)
CREAT SERPL-MCNC: 0.9 MG/DL — SIGNIFICANT CHANGE UP (ref 0.5–1.3)
EOSINOPHIL # BLD AUTO: 0 K/UL — SIGNIFICANT CHANGE UP (ref 0–0.5)
EOSINOPHIL NFR BLD AUTO: 0 % — SIGNIFICANT CHANGE UP (ref 0–5)
GAS PNL BLDA: SIGNIFICANT CHANGE UP
GLUCOSE SERPL-MCNC: 141 MG/DL — HIGH (ref 70–115)
HCO3 BLDA-SCNC: 23 MMOL/L — SIGNIFICANT CHANGE UP (ref 20–26)
HCT VFR BLD CALC: 45.2 % — SIGNIFICANT CHANGE UP (ref 42–52)
HGB BLD-MCNC: 16 G/DL — SIGNIFICANT CHANGE UP (ref 14–18)
HOROWITZ INDEX BLDA+IHG-RTO: 0.4 — SIGNIFICANT CHANGE UP
LYMPHOCYTES # BLD AUTO: 1.2 K/UL — SIGNIFICANT CHANGE UP (ref 1–4.8)
LYMPHOCYTES # BLD AUTO: 8.3 % — LOW (ref 20–55)
MAGNESIUM SERPL-MCNC: 2.5 MG/DL — SIGNIFICANT CHANGE UP (ref 1.6–2.6)
MCHC RBC-ENTMCNC: 28.5 PG — SIGNIFICANT CHANGE UP (ref 27–31)
MCHC RBC-ENTMCNC: 35.4 G/DL — SIGNIFICANT CHANGE UP (ref 32–36)
MCV RBC AUTO: 80.6 FL — SIGNIFICANT CHANGE UP (ref 80–94)
MONOCYTES # BLD AUTO: 0.6 K/UL — SIGNIFICANT CHANGE UP (ref 0–0.8)
MONOCYTES NFR BLD AUTO: 4 % — SIGNIFICANT CHANGE UP (ref 3–10)
NEUTROPHILS # BLD AUTO: 12.6 K/UL — HIGH (ref 1.8–8)
NEUTROPHILS NFR BLD AUTO: 87.4 % — HIGH (ref 37–73)
PCO2 BLDA: 44 MMHG — SIGNIFICANT CHANGE UP (ref 35–45)
PH BLDA: 7.35 — SIGNIFICANT CHANGE UP (ref 7.35–7.45)
PLATELET # BLD AUTO: 272 K/UL — SIGNIFICANT CHANGE UP (ref 150–400)
PO2 BLDA: 170 MMHG — HIGH (ref 83–108)
POTASSIUM SERPL-MCNC: 4.5 MMOL/L — SIGNIFICANT CHANGE UP (ref 3.5–5.3)
POTASSIUM SERPL-SCNC: 4.5 MMOL/L — SIGNIFICANT CHANGE UP (ref 3.5–5.3)
RBC # BLD: 5.61 M/UL — SIGNIFICANT CHANGE UP (ref 4.6–6.2)
RBC # FLD: 13.3 % — SIGNIFICANT CHANGE UP (ref 11–15.6)
SAO2 % BLDA: 100 % — HIGH (ref 95–99)
SODIUM SERPL-SCNC: 142 MMOL/L — SIGNIFICANT CHANGE UP (ref 135–145)
WBC # BLD: 14.4 K/UL — HIGH (ref 4.8–10.8)
WBC # FLD AUTO: 14.4 K/UL — HIGH (ref 4.8–10.8)

## 2019-01-08 PROCEDURE — 99232 SBSQ HOSP IP/OBS MODERATE 35: CPT | Mod: GC

## 2019-01-08 PROCEDURE — 99291 CRITICAL CARE FIRST HOUR: CPT

## 2019-01-08 PROCEDURE — 93010 ELECTROCARDIOGRAM REPORT: CPT

## 2019-01-08 RX ORDER — PANTOPRAZOLE SODIUM 20 MG/1
40 TABLET, DELAYED RELEASE ORAL DAILY
Qty: 0 | Refills: 0 | Status: DISCONTINUED | OUTPATIENT
Start: 2019-01-08 | End: 2019-01-09

## 2019-01-08 RX ORDER — SODIUM CHLORIDE 9 MG/ML
1000 INJECTION INTRAMUSCULAR; INTRAVENOUS; SUBCUTANEOUS
Qty: 0 | Refills: 0 | Status: DISCONTINUED | OUTPATIENT
Start: 2019-01-08 | End: 2019-01-09

## 2019-01-08 RX ORDER — ALPRAZOLAM 0.25 MG
0.25 TABLET ORAL ONCE
Qty: 0 | Refills: 0 | Status: DISCONTINUED | OUTPATIENT
Start: 2019-01-08 | End: 2019-01-09

## 2019-01-08 RX ADMIN — Medication 3 MILLILITER(S): at 17:27

## 2019-01-08 RX ADMIN — Medication 3 MILLILITER(S): at 08:50

## 2019-01-08 RX ADMIN — Medication 3 MILLILITER(S): at 19:47

## 2019-01-08 RX ADMIN — ALBUTEROL 2.5 MILLIGRAM(S): 90 AEROSOL, METERED ORAL at 10:48

## 2019-01-08 RX ADMIN — Medication 60 MILLIGRAM(S): at 10:51

## 2019-01-08 RX ADMIN — Medication 3 MILLILITER(S): at 12:38

## 2019-01-08 RX ADMIN — Medication 3 MILLILITER(S): at 04:20

## 2019-01-08 RX ADMIN — SODIUM CHLORIDE 75 MILLILITER(S): 9 INJECTION INTRAMUSCULAR; INTRAVENOUS; SUBCUTANEOUS at 10:54

## 2019-01-08 RX ADMIN — Medication 60 MILLIGRAM(S): at 16:45

## 2019-01-08 RX ADMIN — Medication 60 MILLIGRAM(S): at 03:23

## 2019-01-08 RX ADMIN — Medication 60 MILLIGRAM(S): at 21:23

## 2019-01-08 RX ADMIN — Medication 3 MILLILITER(S): at 01:36

## 2019-01-08 NOTE — PROGRESS NOTE ADULT - ASSESSMENT
24 y/o M with PMH of uncontrolled asthma, intubated when he was 1 years old, previous hospitalizations due to asthma who presents with shortness of breath, audible wheezing and accessory respiratory muscle in the setting of poorly controlled asthma. Likely asthma exacerbation. Patient with adequate initial response to albuterol, epinephrin, mag sulfate and solu-medrol. He was placed on BiPAP overnight, reports feeling better      Diet: regular  VS: Q 2 rhs  Activity: as tolerated      Status asthmaticus, improving  S/p 3 duo-nebs in the Ed. Will c/o with Duo-nebs Q 4hrs  Start Albuterol nebs Q 2hrs PRN  S/p Solu-medrol 125 mg IV push then 60 mg Q 6hrs  s/p magnesium sulfate 2g IVPB  Oxygen therapy as needed. Now on BiPAP. Wane off as tolerated  MICU consult called, appreciated  VS Q 2 hrs  C/w heliox therapy ordered in the ED  ABG noted.     Prophylactic measure  SCD boots, ambulate as tolerated 22 y/o M with PMH of uncontrolled asthma, intubated when he was 1 years old, previous hospitalizations due to asthma who presents with shortness of breath, audible wheezing and accessory respiratory muscle in the setting of poorly controlled asthma. Likely asthma exacerbation. Patient with adequate initial response to albuterol, epinephrin, mag sulfate and solu-medrol. He was placed on BiPAP overnight, reports feeling better      Diet: regular  VS: Q 2 rhs  Activity: as tolerated      Status asthmaticus, improving  S/p 3 duo-nebs in the Ed. Will c/o with Duo-nebs Q 4hrs  Start Albuterol nebs Q 2hrs PRN  S/p Solu-medrol 125 mg IV push then 60 mg Q 6hrs  s/p magnesium sulfate 2g IVPB  Oxygen therapy as needed. Now on BiPAP. Wane off as tolerated  MICU consult called, appreciated  VS Q 2 hrs  C/w heliox therapy ordered in the ED  ABG noted.   PAtient with poor outpatient follow up. He requested a pulmonologist outpatient    Prophylactic measure  SCD boots, ambulate as tolerated 24 y/o M with PMH of uncontrolled asthma, intubated when he was 1 years old, previous hospitalizations due to asthma who presents with shortness of breath, audible wheezing and accessory respiratory muscle in the setting of poorly controlled asthma. Likely asthma exacerbation. Patient with adequate initial response to albuterol, epinephrin, mag sulfate and solu-medrol. He was placed on BiPAP overnight, reports feeling better.     Status asthmaticus, little improvement   S/p 3 duo-nebs in the Ed. Will c/o with Duo-nebs Q 4hrs  Start Albuterol nebs Q 2hrs PRN  S/p Solu-medrol 125 mg IV push then 60 mg Q 6hrs  s/p magnesium sulfate 2g IVPB  Oxygen therapy as needed. Now on BiPAP. Wane off as tolerated  MICU consult called, appreciated  VS Q 2 hrs  C/w heliox therapy ordered in the ED  ABG noted.   pulmonary consult noted and appreciated    anxiety:   xanax prn     Prophylactic measure  SCD boots, ambulate as tolerated

## 2019-01-08 NOTE — ED ADULT NURSE REASSESSMENT NOTE - NS ED NURSE REASSESS COMMENT FT1
Pt resting comfortably on bipap at this time, reports he is feeling much better. Respirations even and non labored at this time. Pt denies SOB or difficulty breathing. Awaiting bed assignment. Plan of care discussed with patient who verbalized understanding.

## 2019-01-08 NOTE — ED ADULT NURSE REASSESSMENT NOTE - NS ED NURSE REASSESS COMMENT FT1
Pt more appears more comfortable, stated breathing has improved on bipap. Resting comfortably in bed at this time, awaiting bed assignment. Safety maintained.

## 2019-01-08 NOTE — PROGRESS NOTE ADULT - SUBJECTIVE AND OBJECTIVE BOX
Patient is a 23y old  Male who presents with a chief complaint of Shortness of breath (07 Jan 2019 22:41)    INTERVAL HPI/OVERNIGHT EVENTS:  Patient seen and examined at bedside. No acute events overnight. He was on the BiPAP, tolerated well. Patient reports good appetite, he is hungry, tolerating PO intake, voiding     ROS: Denies CP, abd pain, nausea/vomiting, diarrhea/constipation, fever chills, LE pain.    T(C): 36.7 (01-08-19 @ 00:15), Max: 37 (01-07-19 @ 19:28)  HR: 86 (01-08-19 @ 04:10) (86 - 118)  BP: 123/74 (01-08-19 @ 04:10) (117/67 - 176/90)  RR: 15 (01-08-19 @ 04:10) (15 - 26)  SpO2: 100% (01-08-19 @ 04:10) (96% - 100%)    07 Jan 2019 07:01  -  08 Jan 2019 07:00  --------------------------------------------------------  IN: 0 mL / OUT: 600 mL / NET: -600 mL          I&O's Summary    07 Jan 2019 07:01  -  08 Jan 2019 07:00  --------------------------------------------------------  IN: 0 mL / OUT: 600 mL / NET: -600 mL          PHYSICAL EXAM:  General: Well nourished/Well developed, NAD  	Head:  Normocephalic, atraumatic  	ENT:  Mucosa moist, no ulcerations  	Neck:  Supple, no sinuses or palpable masses. Mild accessory muscle use  	Lymphatic:  No palpable cervical, adenopathy  	Respiratory: Generalized inspiratory and expiratory wheezing, improving. No crackles.   	CV: RRR, S1S2, no murmur  	GI: Abdomen soft, NT, ND no palpable mass  	Extremities: No edema, + peripheral pulses, FROM all 4 extremity  Neurology: A&Ox3, no focalization signs    LABS:                        15.5   15.2  )-----------( 301      ( 07 Jan 2019 15:43 )             44.0     01-07    143  |  105  |  9.0  ----------------------------<  117<H>  3.4<L>   |  26.0  |  0.91    Ca    9.0      07 Jan 2019 15:43    TPro  7.8  /  Alb  5.0  /  TBili  0.3<L>  /  DBili  x   /  AST  25  /  ALT  19  /  AlkPhos  98  01-07        CAPILLARY BLOOD GLUCOSE        ABG - ( 08 Jan 2019 01:12 )  pH, Arterial: 7.35  pH, Blood: x     /  pCO2: 44    /  pO2: 170   / HCO3: 23    / Base Excess: -1.9  /  SaO2: 100         Rapid Respiratory Viral Panel (01.07.19 @ 15:57)  	  Rapid RVP Result: NotDetec: This Respiratory Panel uses polymerase chain reaction (PCR) to detect for    	Blood Gas Profile - Arterial (01.07.19 @ 21:00)  	  pH, Arterial: 7.36  	  pCO2, Arterial: 41 mmHg  	  pO2, Arterial: 68 mmHg  	  HCO3, Arterial: 22 mmoL/L  	  Base Excess, Arterial: -2.1 mmol/L  	  Oxygen Saturation, Arterial: 94 %  	  Blood Gas Comments Arterial: helium tank  	  Blood Gas Source Arterial: Arterial    RADIOLOGY & ADDITIONAL TESTS:    	< from: Xray Chest 1 View-PORTABLE IMMEDIATE (01.07.19 @ 16:08) >  	 EXAM:  XR CHEST PORTABLE IMMED 1V                        	PROCEDURE DATE:  01/07/2019    	INTERPRETATION:  History: Dyspnea  	Technique:  PA and lateral views  	Comparisons:  Chest x-ray dated 6/3/2018  	Findings:  No acute infiltrates, congestion or pleural effusions  .      	The pulmonary vasculature and aorta are normal for age. Heart size is unremarkable.   	The thorax is normal for age.  	Impression: No acute pulmonary disease.   	CALLUM BLANKENSHIP M.D., ATTENDINGRADIOLOGIST  	This document has been electronically signed. Jan 7 2019  4:26PM    	< end of copied text  MEDICATIONS  (STANDING):  ALBUTerol/ipratropium for Nebulization 3 milliLiter(s) Nebulizer every 4 hours  methylPREDNISolone sodium succinate Injectable 60 milliGRAM(s) IV Push every 6 hours    MEDICATIONS  (PRN):  ALBUTerol    0.083% 2.5 milliGRAM(s) Nebulizer every 2 hours PRN Shortness of Breath and/or Wheezing        Assessment: Patient is a 23y old  Male who presents with a chief complaint of Shortness of breath     INTERVAL HPI/OVERNIGHT EVENTS:  Patient seen and examined at bedside. No acute events overnight. He was on the BiPAP, tolerated well. Patient reports good appetite, he is hungry, tolerating PO intake, voiding     ROS: Denies CP, abd pain, nausea/vomiting, diarrhea/constipation, fever chills, LE pain.    T(C): 36.7 (01-08-19 @ 00:15), Max: 37 (01-07-19 @ 19:28)  HR: 86 (01-08-19 @ 04:10) (86 - 118)  BP: 123/74 (01-08-19 @ 04:10) (117/67 - 176/90)  RR: 15 (01-08-19 @ 04:10) (15 - 26)  SpO2: 100% (01-08-19 @ 04:10) (96% - 100%)      PHYSICAL EXAM:  General: Well nourished/Well developed, NAD  Head:  Normocephalic, atraumatic  ENT:  Mucosa moist, no ulcerations  Neck:  Supple, no sinuses or palpable masses. Mild accessory muscle use  Lymphatic:  No palpable cervical, adenopathy  Respiratory: Generalized inspiratory and expiratory wheezing, improving. No crackles.   CV: RRR, S1S2, no murmur  GI: Abdomen soft, NT, ND no palpable mass  Extremities: No edema, + peripheral pulses, FROM all 4 extremity  Neurology: A&Ox3, no focalization signs    LABS:                        15.5   15.2  )-----------( 301      ( 07 Jan 2019 15:43 )             44.0     01-07    143  |  105  |  9.0  ----------------------------<  117<H>  3.4<L>   |  26.0  |  0.91    Ca    9.0      07 Jan 2019 15:43    TPro  7.8  /  Alb  5.0  /  TBili  0.3<L>  /  DBili  x   /  AST  25  /  ALT  19  /  AlkPhos  98  01-07        ABG - ( 08 Jan 2019 01:12 )  pH, Arterial: 7.35  pH, Blood: x     /  pCO2: 44    /  pO2: 170   / HCO3: 23    / Base Excess: -1.9  /  SaO2: 100         Rapid Respiratory Viral Panel (01.07.19 @ 15:57)  	  Rapid RVP Result: NotDetec: This Respiratory Panel uses polymerase chain reaction (PCR) to detect for    	Blood Gas Profile - Arterial (01.07.19 @ 21:00)  	  pH, Arterial: 7.36  	  pCO2, Arterial: 41 mmHg  	  pO2, Arterial: 68 mmHg  	  HCO3, Arterial: 22 mmoL/L  	  Base Excess, Arterial: -2.1 mmol/L  	  Oxygen Saturation, Arterial: 94 %  	  Blood Gas Comments Arterial: helium tank  	  Blood Gas Source Arterial: Arterial    RADIOLOGY & ADDITIONAL TESTS:    	< from: Xray Chest 1 View-PORTABLE IMMEDIATE (01.07.19 @ 16:08) >  	 EXAM:  XR CHEST PORTABLE IMMED 1V                        	PROCEDURE DATE:  01/07/2019    	INTERPRETATION:  History: Dyspnea  	Technique:  PA and lateral views  	Comparisons:  Chest x-ray dated 6/3/2018  	Findings:  No acute infiltrates, congestion or pleural effusions  .      	The pulmonary vasculature and aorta are normal for age. Heart size is unremarkable.   	The thorax is normal for age.  	Impression: No acute pulmonary disease.   	CALLUM BLANKENSHIP M.D., ATTENDINGRADIOLOGIST  	This document has been electronically signed. Jan 7 2019  4:26PM    	< end of copied text  MEDICATIONS  (STANDING):  ALBUTerol/ipratropium for Nebulization 3 milliLiter(s) Nebulizer every 4 hours  methylPREDNISolone sodium succinate Injectable 60 milliGRAM(s) IV Push every 6 hours    MEDICATIONS  (PRN):  ALBUTerol    0.083% 2.5 milliGRAM(s) Nebulizer every 2 hours PRN Shortness of Breath and/or Wheezing

## 2019-01-08 NOTE — CONSULT NOTE ADULT - SUBJECTIVE AND OBJECTIVE BOX
PULMONARY CONSULT NOTE      LAKESHA BRIGHTN-[id number]    Patient is a 23y old  Male who presents with a chief complaint of Shortness of breath (08 Jan 2019 07:11)  Patient with 3 days of increased shortness of breath, cough and wheeze without fever. Has only been on PRN albuterol.  Last seen in March without followup thereafter.  Found to have moderate obstruction.  Advised Breo>not on any controller medication.  Currently seen on BIPAP 10/5 but with continued discomfort>improved with settings increased to 14/8.  Review of lab data with increased eos. Smokes "socially".      INTERVAL HPI/OVERNIGHT EVENTS:    MEDICATIONS  (STANDING):  ALBUTerol/ipratropium for Nebulization 3 milliLiter(s) Nebulizer every 4 hours  ALPRAZolam 0.25 milliGRAM(s) Oral once  methylPREDNISolone sodium succinate Injectable 60 milliGRAM(s) IV Push every 6 hours  sodium chloride 0.9%. 1000 milliLiter(s) (75 mL/Hr) IV Continuous <Continuous>      MEDICATIONS  (PRN):  ALBUTerol    0.083% 2.5 milliGRAM(s) Nebulizer every 2 hours PRN Shortness of Breath and/or Wheezing      Allergies    No Known Drug Allergies  peanuts (Other)    Intolerances        PAST MEDICAL & SURGICAL HISTORY:  Asthma  S/P wrist surgery: right wrist at age 13.  S/P tonsillectomy and adenoidectomy: at age 9      FAMILY HISTORY:  Family history of bipolar disorder (Mother)  Family history of asthma (Father, Mother)      SOCIAL HISTORY  Smoking History: Per HPI    REVIEW OF SYSTEMS:    CONSTITUTIONAL:  As per HPI.    HEENT:  Eyes:  No diplopia or blurred vision. ENT:  No earache, sore throat or runny nose.    CARDIOVASCULAR:  No pressure, squeezing, tightness, heaviness or aching about the chest; no palpitations.    RESPIRATORY: Per HPI.    GASTROINTESTINAL:  No nausea, vomiting or diarrhea.    GENITOURINARY:  No dysuria, frequency or urgency.    MUSCULOSKELETAL:  No joint pains    SKIN:  No new lesions.    NEUROLOGIC:  No paresthesias, fasciculations, seizures or weakness.    PSYCHIATRIC:  No disorder of thought or mood.    ENDOCRINE:  No heat or cold intolerance, polyuria or polydipsia.    HEMATOLOGICAL:  No easy bruising or bleeding.     Vital Signs Last 24 Hrs  T(C): 36.7 (08 Jan 2019 00:15), Max: 37 (07 Jan 2019 19:28)  T(F): 98.1 (08 Jan 2019 00:15), Max: 98.6 (07 Jan 2019 19:28)  HR: 86 (08 Jan 2019 04:10) (86 - 118)  BP: 123/74 (08 Jan 2019 04:10) (117/67 - 176/90)  BP(mean): --  RR: 15 (08 Jan 2019 04:10) (15 - 26)  SpO2: 100% (08 Jan 2019 04:10) (96% - 100%)    PHYSICAL EXAMINATION:    GENERAL: The patient is a well-developed, well-nourished _____in no apparent distress.     HEENT: Head is normocephalic and atraumatic. Extraocular muscles are intact. Mucous membranes are moist.     NECK: Supple.     LUNGS: Diffuse bilateral expiratory wheeze.  Fair entry with BIPAP.     HEART: Regular rate and rhythm without murmur.    ABDOMEN: Soft, nontender, and nondistended.  No hepatosplenomegaly is noted.    EXTREMITIES: Without any cyanosis, clubbing, rash, lesions or edema.    NEUROLOGIC: Grossly intact.    SKIN: No ulceration or induration present.      LABS:                        16.0   14.4  )-----------( 272      ( 08 Jan 2019 09:20 )             45.2     01-08    142  |  105  |  10.0  ----------------------------<  141<H>  4.5   |  21.0<L>  |  0.90    Ca    10.1      08 Jan 2019 09:20  Mg     2.5     01-08    TPro  7.8  /  Alb  5.0  /  TBili  0.3<L>  /  DBili  x   /  AST  25  /  ALT  19  /  AlkPhos  98  01-07        ABG - ( 08 Jan 2019 01:12 )  pH, Arterial: 7.35  pH, Blood: x     /  pCO2: 44    /  pO2: 170   / HCO3: 23    / Base Excess: -1.9  /  SaO2: 100                             MICROBIOLOGY:    RADIOLOGY & ADDITIONAL STUDIES:< from: Xray Chest 1 View-PORTABLE IMMEDIATE (01.07.19 @ 16:08) >   EXAM:  XR CHEST PORTABLE IMMED 1V                          PROCEDURE DATE:  01/07/2019          INTERPRETATION:  History: Dyspnea    Technique:  PA and lateral views    Comparisons:  Chest x-ray dated 6/3/2018    Findings:     No acute infiltrates, congestion or pleural effusions  .      The pulmonary vasculature and aorta are normal for age. Heart size is   unremarkable.     The thorax is normal for age.    Impression: No acute pulmonary disease.       < end of copied text >  Personal review of CXR>hyperinflated, no infiltrates.

## 2019-01-08 NOTE — ED ADULT NURSE REASSESSMENT NOTE - NS ED NURSE REASSESS COMMENT FT1
Assumed pt care at 1645.  Pt a&ox4 resting comfortably with VSS.  Pt denies any c/o pain at this time, no acute s/s of respiratory distress noted or reported, will continue to monitor

## 2019-01-08 NOTE — CONSULT NOTE ADULT - ASSESSMENT
Patient with respiratory distress secondary to status asthmaticus.  Increased eos suggests immunologic phenotype but has not even been on controller medications.   Currently requiring BIPAP for increased work of breathing>better with change in settings>needs more IPAP and especially EPAP.      Plan:  1.Continue BIPAP at 14/8  2.IV steroids as ordered  3.Bronchodilators ATC  4.PPI  5.Needs to be closely monitored>step down  Patient currently stable but condition is critical.

## 2019-01-09 ENCOUNTER — INBOUND DOCUMENT (OUTPATIENT)
Age: 24
End: 2019-01-09

## 2019-01-09 ENCOUNTER — TRANSCRIPTION ENCOUNTER (OUTPATIENT)
Age: 24
End: 2019-01-09

## 2019-01-09 VITALS
TEMPERATURE: 98 F | DIASTOLIC BLOOD PRESSURE: 66 MMHG | SYSTOLIC BLOOD PRESSURE: 128 MMHG | RESPIRATION RATE: 18 BRPM | OXYGEN SATURATION: 95 % | HEART RATE: 70 BPM

## 2019-01-09 LAB
ANION GAP SERPL CALC-SCNC: 14 MMOL/L — SIGNIFICANT CHANGE UP (ref 5–17)
BASOPHILS # BLD AUTO: 0 K/UL — SIGNIFICANT CHANGE UP (ref 0–0.2)
BASOPHILS NFR BLD AUTO: 0.1 % — SIGNIFICANT CHANGE UP (ref 0–2)
BUN SERPL-MCNC: 21 MG/DL — HIGH (ref 8–20)
CALCIUM SERPL-MCNC: 10 MG/DL — SIGNIFICANT CHANGE UP (ref 8.6–10.2)
CHLORIDE SERPL-SCNC: 102 MMOL/L — SIGNIFICANT CHANGE UP (ref 98–107)
CO2 SERPL-SCNC: 25 MMOL/L — SIGNIFICANT CHANGE UP (ref 22–29)
CREAT SERPL-MCNC: 0.98 MG/DL — SIGNIFICANT CHANGE UP (ref 0.5–1.3)
EOSINOPHIL # BLD AUTO: 0 K/UL — SIGNIFICANT CHANGE UP (ref 0–0.5)
EOSINOPHIL NFR BLD AUTO: 0 % — SIGNIFICANT CHANGE UP (ref 0–5)
GLUCOSE SERPL-MCNC: 132 MG/DL — HIGH (ref 70–115)
HCT VFR BLD CALC: 45.8 % — SIGNIFICANT CHANGE UP (ref 42–52)
HGB BLD-MCNC: 16.3 G/DL — SIGNIFICANT CHANGE UP (ref 14–18)
LYMPHOCYTES # BLD AUTO: 1.1 K/UL — SIGNIFICANT CHANGE UP (ref 1–4.8)
LYMPHOCYTES # BLD AUTO: 6.5 % — LOW (ref 20–55)
MCHC RBC-ENTMCNC: 28.5 PG — SIGNIFICANT CHANGE UP (ref 27–31)
MCHC RBC-ENTMCNC: 35.6 G/DL — SIGNIFICANT CHANGE UP (ref 32–36)
MCV RBC AUTO: 80.2 FL — SIGNIFICANT CHANGE UP (ref 80–94)
MONOCYTES # BLD AUTO: 1.1 K/UL — HIGH (ref 0–0.8)
MONOCYTES NFR BLD AUTO: 6.5 % — SIGNIFICANT CHANGE UP (ref 3–10)
NEUTROPHILS # BLD AUTO: 14.3 K/UL — HIGH (ref 1.8–8)
NEUTROPHILS NFR BLD AUTO: 86.5 % — HIGH (ref 37–73)
PLATELET # BLD AUTO: 264 K/UL — SIGNIFICANT CHANGE UP (ref 150–400)
POTASSIUM SERPL-MCNC: 4.5 MMOL/L — SIGNIFICANT CHANGE UP (ref 3.5–5.3)
POTASSIUM SERPL-SCNC: 4.5 MMOL/L — SIGNIFICANT CHANGE UP (ref 3.5–5.3)
RBC # BLD: 5.71 M/UL — SIGNIFICANT CHANGE UP (ref 4.6–6.2)
RBC # FLD: 13.5 % — SIGNIFICANT CHANGE UP (ref 11–15.6)
SODIUM SERPL-SCNC: 141 MMOL/L — SIGNIFICANT CHANGE UP (ref 135–145)
WBC # BLD: 16.5 K/UL — HIGH (ref 4.8–10.8)
WBC # FLD AUTO: 16.5 K/UL — HIGH (ref 4.8–10.8)

## 2019-01-09 PROCEDURE — 71045 X-RAY EXAM CHEST 1 VIEW: CPT

## 2019-01-09 PROCEDURE — 93005 ELECTROCARDIOGRAM TRACING: CPT

## 2019-01-09 PROCEDURE — 94660 CPAP INITIATION&MGMT: CPT

## 2019-01-09 PROCEDURE — 80053 COMPREHEN METABOLIC PANEL: CPT

## 2019-01-09 PROCEDURE — 80048 BASIC METABOLIC PNL TOTAL CA: CPT

## 2019-01-09 PROCEDURE — 96374 THER/PROPH/DIAG INJ IV PUSH: CPT

## 2019-01-09 PROCEDURE — 96375 TX/PRO/DX INJ NEW DRUG ADDON: CPT

## 2019-01-09 PROCEDURE — 87798 DETECT AGENT NOS DNA AMP: CPT

## 2019-01-09 PROCEDURE — 82803 BLOOD GASES ANY COMBINATION: CPT

## 2019-01-09 PROCEDURE — 99233 SBSQ HOSP IP/OBS HIGH 50: CPT

## 2019-01-09 PROCEDURE — 85027 COMPLETE CBC AUTOMATED: CPT

## 2019-01-09 PROCEDURE — 99291 CRITICAL CARE FIRST HOUR: CPT | Mod: 25

## 2019-01-09 PROCEDURE — 87633 RESP VIRUS 12-25 TARGETS: CPT

## 2019-01-09 PROCEDURE — 99239 HOSP IP/OBS DSCHRG MGMT >30: CPT

## 2019-01-09 PROCEDURE — 83735 ASSAY OF MAGNESIUM: CPT

## 2019-01-09 PROCEDURE — 87581 M.PNEUMON DNA AMP PROBE: CPT

## 2019-01-09 PROCEDURE — 94640 AIRWAY INHALATION TREATMENT: CPT

## 2019-01-09 PROCEDURE — 87486 CHLMYD PNEUM DNA AMP PROBE: CPT

## 2019-01-09 PROCEDURE — 36415 COLL VENOUS BLD VENIPUNCTURE: CPT

## 2019-01-09 PROCEDURE — 96372 THER/PROPH/DIAG INJ SC/IM: CPT | Mod: XU

## 2019-01-09 PROCEDURE — 94760 N-INVAS EAR/PLS OXIMETRY 1: CPT

## 2019-01-09 RX ORDER — FLUTICASONE FUROATE AND VILANTEROL TRIFENATATE 100; 25 UG/1; UG/1
1 POWDER RESPIRATORY (INHALATION)
Qty: 1 | Refills: 0 | OUTPATIENT
Start: 2019-01-09 | End: 2019-02-07

## 2019-01-09 RX ORDER — FLUTICASONE PROPIONATE AND SALMETEROL 50; 250 UG/1; UG/1
1 POWDER ORAL; RESPIRATORY (INHALATION)
Qty: 2 | Refills: 0
Start: 2019-01-09 | End: 2019-02-07

## 2019-01-09 RX ORDER — INFLUENZA VIRUS VACCINE 15; 15; 15; 15 UG/.5ML; UG/.5ML; UG/.5ML; UG/.5ML
0.5 SUSPENSION INTRAMUSCULAR ONCE
Qty: 0 | Refills: 0 | Status: DISCONTINUED | OUTPATIENT
Start: 2019-01-09 | End: 2019-01-09

## 2019-01-09 RX ORDER — PANTOPRAZOLE SODIUM 20 MG/1
1 TABLET, DELAYED RELEASE ORAL
Qty: 30 | Refills: 0
Start: 2019-01-09 | End: 2019-02-07

## 2019-01-09 RX ORDER — ALBUTEROL 90 UG/1
2 AEROSOL, METERED ORAL
Qty: 2 | Refills: 0
Start: 2019-01-09 | End: 2019-02-07

## 2019-01-09 RX ADMIN — Medication 60 MILLIGRAM(S): at 10:23

## 2019-01-09 RX ADMIN — Medication 3 MILLILITER(S): at 01:05

## 2019-01-09 RX ADMIN — Medication 60 MILLIGRAM(S): at 04:54

## 2019-01-09 RX ADMIN — Medication 3 MILLILITER(S): at 04:23

## 2019-01-09 RX ADMIN — Medication 3 MILLILITER(S): at 12:39

## 2019-01-09 RX ADMIN — Medication 3 MILLILITER(S): at 09:01

## 2019-01-09 NOTE — DISCHARGE NOTE ADULT - MEDICATION SUMMARY - MEDICATIONS TO TAKE
I will START or STAY ON the medications listed below when I get home from the hospital:    predniSONE 10 mg oral tablet  -- 5 tab once a day 2 days  4 tab once a day 2 days  3 tab once a day 2 days  2 tab once a day 2 days  1 tab once a day 2 day  -- It is very important that you take or use this exactly as directed.  Do not skip doses or discontinue unless directed by your doctor.  Obtain medical advice before taking any non-prescription drugs as some may affect the action of this medication.  Take with food or milk.    -- Indication: For Asthma    Breo Ellipta 200 mcg-25 mcg/inh inhalation powder  -- 1 puff(s) inhaled once a day   -- Check with your doctor before becoming pregnant.  Expires___________________  For inhalation only.  It is very important that you take or use this exactly as directed.  Do not skip doses or discontinue unless directed by your doctor.  Obtain medical advice before taking any non-prescription drugs as some may affect the action of this medication.  Rinse mouth thoroughly after use.    -- Indication: For Asthma    ipratropium-albuterol 0.5 mg-2.5 mg/3 mLinhalation solution  -- 3 milliliter(s) by nebulizer every 6 hours   -- For inhalation only.  It is very important that you take or use this exactly as directed.  Do not skip doses or discontinue unless directed by your doctor.  Obtain medical advice before taking any non-prescription drugs as some may affect the action of this medication.    -- Indication: For Asthma    albuterol 90 mcg/inh inhalation aerosol  -- 2 puff(s) inhaled every 4 hours, As Needed   -- For inhalation only.  It is very important that you take or use this exactly as directed.  Do not skip doses or discontinue unless directed by your doctor.  Obtain medical advice before taking any non-prescription drugs as some may affect the action of this medication.  Shake well before use.    -- Indication: For Asthma    Protonix 40 mg oral delayed release tablet  -- 1 tab(s) by mouth once a day   -- It is very important that you take or use this exactly as directed.  Do not skip doses or discontinue unless directed by your doctor.  Obtain medical advice before taking any non-prescription drugs as some may affect the action of this medication.  Swallow whole.  Do not crush.    -- Indication: For Preventive measure I will START or STAY ON the medications listed below when I get home from the hospital:    predniSONE 10 mg oral tablet  -- 5 tab once a day 2 days  4 tab once a day 2 days  3 tab once a day 2 days  2 tab once a day 2 days  1 tab once a day 2 day  -- It is very important that you take or use this exactly as directed.  Do not skip doses or discontinue unless directed by your doctor.  Obtain medical advice before taking any non-prescription drugs as some may affect the action of this medication.  Take with food or milk.    -- Indication: For Asthma    ipratropium-albuterol 0.5 mg-2.5 mg/3 mLinhalation solution  -- 3 milliliter(s) by nebulizer every 6 hours   -- For inhalation only.  It is very important that you take or use this exactly as directed.  Do not skip doses or discontinue unless directed by your doctor.  Obtain medical advice before taking any non-prescription drugs as some may affect the action of this medication.    -- Indication: For Asthma    albuterol 90 mcg/inh inhalation aerosol  -- 2 puff(s) inhaled every 4 hours, As Needed   -- For inhalation only.  It is very important that you take or use this exactly as directed.  Do not skip doses or discontinue unless directed by your doctor.  Obtain medical advice before taking any non-prescription drugs as some may affect the action of this medication.  Shake well before use.    -- Indication: For Asthma    AirDuo RespiClick 113 mcg-14 mcg/inh inhalation powder  -- 1 puff(s) inhaled 2 times a day   -- Check with your doctor before becoming pregnant.  For inhalation only.  Obtain medical advice before taking any non-prescription drugs as some may affect the action of this medication.  Rinse mouth thoroughly after use.    -- Indication: For Asthma    Protonix 40 mg oral delayed release tablet  -- 1 tab(s) by mouth once a day   -- It is very important that you take or use this exactly as directed.  Do not skip doses or discontinue unless directed by your doctor.  Obtain medical advice before taking any non-prescription drugs as some may affect the action of this medication.  Swallow whole.  Do not crush.    -- Indication: For Preventive measure

## 2019-01-09 NOTE — DISCHARGE NOTE ADULT - HOSPITAL COURSE
24 y/o M with PMH of uncontrolled asthma, intubated when he was 1 years old, previous hospitalizations due to asthma who presents with shortness of breath, audible wheezing and accessory respiratory muscle in the setting of poorly controlled asthma. Likely asthma exacerbation. Patient with adequate initial response to albuterol and epinephrin but still symptomatic. Will admit for acute asthma exacerbation 24 y/o M with PMH of uncontrolled asthma, intubated when he was 1 years old, previous hospitalizations, medication non compliant, current "social" smoker, presented with SOB, audible wheezing and accessory respiratory muscle in the setting of poorly controlled asthma, admitted for respiratory distress 2/2 status asthmaticus. In ED, pt received multiple rounds of DuoNebs, steroids, heliox therapy with continued diffuse wheezing and moderate respiratory distress. ABG revealed normal pH, pCO2 of 41 concerning for respiratory alkalosis. Patient placed on bipap due to concerns of respiratory fatigue, on which patient clinically improved, along with duonebs Q4, solumedrol Q6 and followed by pulm who agreed with plan, changed bipap settings. MICU consult called, patient deemed not a candidate. Patient was monitored closely and was given Mg, continued on above treatment with clinical improvement noted. Patient expressed that he wanted to leave today, discussed with pulmonary who agreed patient could go home with prednisone taper, albuterol rescue inhaler, breo ellipta, prescriptions and f/u with Pulm within 1 week and PMD, patient agrees with plan.     Patient medically optimized for discharge.   Time spent: 45 minutes 24 y/o M with PMH of uncontrolled asthma, intubated when he was 1 years old, previous hospitalizations, medication non compliant, current "social" smoker, presented with SOB, audible wheezing and accessory respiratory muscle in the setting of poorly controlled asthma, admitted for respiratory distress 2/2 status asthmaticus. In ED, pt received multiple rounds of DuoNebs, steroids, heliox therapy with continued diffuse wheezing and moderate respiratory distress. ABG revealed normal pH, pCO2 of 41 concerning for respiratory alkalosis. Patient placed on bipap due to concerns of respiratory fatigue, on which patient clinically improved, along with duonebs Q4, solumedrol Q6 and followed by pulm who agreed with plan, changed bipap settings. MICU consult called, patient deemed not a candidate. Patient was monitored closely and was given Mg, continued on above treatment with clinical improvement noted. Patient expressed that he wanted to leave today, discussed with pulmonary who agreed patient could go home with prednisone taper, albuterol rescue inhaler, breo ellipta, prescriptions and f/u with Pulm within 1 week and PMD, patient agrees with plan.     Vital Signs Last 24 Hrs  T(C): 36.8 (09 Jan 2019 07:34), Max: 36.8 (09 Jan 2019 07:34)  T(F): 98.3 (09 Jan 2019 07:34), Max: 98.3 (09 Jan 2019 07:34)  HR: 74 (09 Jan 2019 07:34) (62 - 99)  BP: 118/77 (09 Jan 2019 07:34) (118/77 - 135/61)  BP(mean): --  RR: 18 (09 Jan 2019 07:34) (17 - 18)  SpO2: 94% (09 Jan 2019 09:02) (94% - 97%)    PHYSICAL EXAM:  General: Well nourished/Well developed, NAD  Head:  Normocephalic, atraumatic  ENT:  Mucosa moist, no ulcerations  Neck:  Supple, no sinuses or palpable masses. Mild accessory muscle use  Lymphatic:  No palpable cervical, adenopathy  Respiratory: mild left sided expiratory wheezing, improving. No crackles. good air entry   CV: RRR, S1S2, no murmur  GI: Abdomen soft, NT, ND no palpable mass  Extremities: No edema, + peripheral pulses, FROM all 4 extremity     Patient medically optimized for discharge. plan of care discussed with patient and pulmonology. patient cleared by pulmonary for discharge.     Time spent: 58 minutes

## 2019-01-09 NOTE — PROGRESS NOTE ADULT - SUBJECTIVE AND OBJECTIVE BOX
Patient is a 23y old  Male who presents with a chief complaint of Shortness of breath (08 Jan 2019 11:01)    HOSPITALIZATION DAY:2  INTERVAL/OVERNIGHT EVENTS:  Patient seen and examined at bedside, no acute events overnight. Patient used BiPap for 4 hours last night until 11 pm bute did not need to use it for the rest of the night, stayed on 4L NC, tolerating well. Speaking in full sentences, and able to walk to bathroom down the [place] withot dyspnea. Patient is tolerating _ diet w/o nausea/vomiting/diarrhea/abdominal pain. Patient is voiding actively and last BM was yesterday.  Patient denies chest pain, calf pain, abdominal pain, headaches, fever, chills, dysuria, hematuria, diarrhea, constipation, SOB, palpitations. Rest of ROS not contributory except for above.     CARDIAC MONITOR  OXYGEN:   Nasal Cannula  4 L/min     O2 Sat:   95 %    V/S:  T(C): 37.1 (08 Jan 2019 09:00), Max: 37.1 (08 Jan 2019 09:00)  T(F): 98.7 (08 Jan 2019 09:00), Max: 98.7 (08 Jan 2019 09:00)  HR: 85 (09 Jan 2019 04:26) (62 - 101)  BP: 135/61 (08 Jan 2019 16:48) (116/64 - 135/61)  RR: 17 (08 Jan 2019 16:48) (17 - 17)  SpO2: 96% (09 Jan 2019 04:26) (94% - 97%)    PHYSICAL EXAM:  General: Well nourished/Well developed, NAD  Head:  Normocephalic, atraumatic  ENT:  Mucosa moist, no ulcerations  Neck:  Supple, no sinuses or palpable masses. Mild accessory muscle use  Lymphatic:  No palpable cervical, adenopathy  Respiratory: mild left sided expiratory wheezing, improving. No crackles. good air entry   CV: RRR, S1S2, no murmur  GI: Abdomen soft, NT, ND no palpable mass  Extremities: No edema, + peripheral pulses, FROM all 4 extremity  Neurology: A&Ox3, no focalization signs    LABS:                          16.0   14.4  )-----------( 272      ( 08 Jan 2019 09:20 )             45.2         01-08    142  |  105  |  10.0  ----------------------------<  141<H>  4.5   |  21.0<L>  |  0.90    Ca    10.1      08 Jan 2019 09:20  Mg     2.5     01-08    TPro  7.8  /  Alb  5.0  /  TBili  0.3<L>  /  DBili  x   /  AST  25  /  ALT  19  /  AlkPhos  98  01-07    LIVER FUNCTIONS - ( 07 Jan 2019 15:43 )  Alb: 5.0 g/dL / Pro: 7.8 g/dL / ALK PHOS: 98 U/L / ALT: 19 U/L / AST: 25 U/L / GGT: x           IMAGING: < from: Xray Chest 1 View-PORTABLE IMMEDIATE (01.07.19 @ 16:08) >  Impression: No acute pulmonary disease.     DIET: regular    MEDICATIONS  (STANDING):  ALBUTerol/ipratropium for Nebulization 3 milliLiter(s) Nebulizer every 4 hours  ALPRAZolam 0.25 milliGRAM(s) Oral once  methylPREDNISolone sodium succinate Injectable 60 milliGRAM(s) IV Push every 6 hours  pantoprazole  Injectable 40 milliGRAM(s) IV Push daily  sodium chloride 0.9%. 1000 milliLiter(s) (75 mL/Hr) IV Continuous <Continuous>    MEDICATIONS  (PRN):  ALBUTerol    0.083% 2.5 milliGRAM(s) Nebulizer every 2 hours PRN Shortness of Breath and/or Wheezing Patient is a 23y old  Male who presents with a chief complaint of Shortness of breath     HOSPITALIZATION DAY:2  INTERVAL/OVERNIGHT EVENTS:  Patient seen and examined at bedside, no acute events overnight. Patient used BiPap for 4 hours last night until 11 pm bute did not need to use it for the rest of the night, stayed on 4L NC, tolerating well. Speaking in full sentences, and able to walk to bathroom down the [place] withot dyspnea. Patient is tolerating _ diet w/o nausea/vomiting/diarrhea/abdominal pain. Patient is voiding actively and last BM was yesterday.  Patient denies chest pain, calf pain, abdominal pain, headaches, fever, chills, dysuria, hematuria, diarrhea, constipation, SOB, palpitations. Rest of ROS not contributory except for above.     CARDIAC MONITOR  OXYGEN:   Nasal Cannula  4 L/min     O2 Sat:   95 %    V/S:  T(C): 37.1 (08 Jan 2019 09:00), Max: 37.1 (08 Jan 2019 09:00)  T(F): 98.7 (08 Jan 2019 09:00), Max: 98.7 (08 Jan 2019 09:00)  HR: 85 (09 Jan 2019 04:26) (62 - 101)  BP: 135/61 (08 Jan 2019 16:48) (116/64 - 135/61)  RR: 17 (08 Jan 2019 16:48) (17 - 17)  SpO2: 96% (09 Jan 2019 04:26) (94% - 97%)    PHYSICAL EXAM:  General: Well nourished/Well developed, NAD  Head:  Normocephalic, atraumatic  ENT:  Mucosa moist, no ulcerations  Neck:  Supple, no sinuses or palpable masses. Mild accessory muscle use  Lymphatic:  No palpable cervical, adenopathy  Respiratory: mild left sided expiratory wheezing, improving. No crackles. good air entry   CV: RRR, S1S2, no murmur  GI: Abdomen soft, NT, ND no palpable mass  Extremities: No edema, + peripheral pulses, FROM all 4 extremity  Neurology: A&Ox3, no focalization signs    LABS:                          16.0   14.4  )-----------( 272      ( 08 Jan 2019 09:20 )             45.2         01-08    142  |  105  |  10.0  ----------------------------<  141<H>  4.5   |  21.0<L>  |  0.90    Ca    10.1      08 Jan 2019 09:20  Mg     2.5     01-08    TPro  7.8  /  Alb  5.0  /  TBili  0.3<L>  /  DBili  x   /  AST  25  /  ALT  19  /  AlkPhos  98  01-07    LIVER FUNCTIONS - ( 07 Jan 2019 15:43 )  Alb: 5.0 g/dL / Pro: 7.8 g/dL / ALK PHOS: 98 U/L / ALT: 19 U/L / AST: 25 U/L / GGT: x           IMAGING: < from: Xray Chest 1 View-PORTABLE IMMEDIATE (01.07.19 @ 16:08) >  Impression: No acute pulmonary disease.     DIET: regular    MEDICATIONS  (STANDING):  ALBUTerol/ipratropium for Nebulization 3 milliLiter(s) Nebulizer every 4 hours  ALPRAZolam 0.25 milliGRAM(s) Oral once  methylPREDNISolone sodium succinate Injectable 60 milliGRAM(s) IV Push every 6 hours  pantoprazole  Injectable 40 milliGRAM(s) IV Push daily  sodium chloride 0.9%. 1000 milliLiter(s) (75 mL/Hr) IV Continuous <Continuous>    MEDICATIONS  (PRN):  ALBUTerol    0.083% 2.5 milliGRAM(s) Nebulizer every 2 hours PRN Shortness of Breath and/or Wheezing

## 2019-01-09 NOTE — PROGRESS NOTE ADULT - ASSESSMENT
Status asthmaticus nearly resolved    Plan  DC BIPAP  Change to PO prednisone at 60 mg  taper prednisone over 12 days  Consider Breo 200/25  Home nebulizer with albuterol  f/u with us in 2wks  will need IGE and PFT's as outpt  will sign off  recall prn

## 2019-01-09 NOTE — DISCHARGE NOTE ADULT - INSTRUCTIONS
Choose lean meats and poultry without skin and prepare them without added saturated and trans fat.  Eat fish at least twice a week. Recent research shows that eating oily fish containing omega-3 fatty acids (for example, salmon, trout and herring) may help lower your risk of death from coronary artery disease.  Select fat-free, 1 percent fat and low-fat dairy products.  Cut back on foods containing partially hydrogenated vegetable oils to reduce trans fat in your diet. To lower cholesterol, reduce saturated fat to no more than 5 to 6 percent of total calories. For someone eating 2,000 calories a day, that’s about 13 grams of saturated fat.  Cut back on beverages and foods with added sugars.  Choose and prepare foods with little or no salt. To lower blood pressure, aim to eat no more than 2,400 milligrams of sodium per day. Reducing daily intake to 1,500 mg is desirable because it can lower blood pressure even further.  If you drink alcohol, drink in moderation. That means two drinks  per day if you’re a man.  Follow the American Heart Association recommendations when you eat out, and keep an eye on your portion sizes.

## 2019-01-09 NOTE — DISCHARGE NOTE ADULT - OTHER SIGNIFICANT FINDINGS
CBC Full  -  ( 09 Jan 2019 06:59 )  WBC Count : 16.5 K/uL  Hemoglobin : 16.3 g/dL  Hematocrit : 45.8 %  Platelet Count - Automated : 264 K/uL  Mean Cell Volume : 80.2 fl  Mean Cell Hemoglobin : 28.5 pg  Mean Cell Hemoglobin Concentration : 35.6 g/dL  Auto Neutrophil # : 14.3 K/uL  Auto Lymphocyte # : 1.1 K/uL  Auto Monocyte # : 1.1 K/uL  Auto Eosinophil # : 0.0 K/uL  Auto Basophil # : 0.0 K/uL  Auto Neutrophil % : 86.5 %  Auto Lymphocyte % : 6.5 %  Auto Monocyte % : 6.5 %  Auto Eosinophil % : 0.0 %  Auto Basophil % : 0.1 %    JANUARY 7:  WBC Count : 15.2 K/uL  	Hemoglobin : 15.5 g/dL  	Hematocrit : 44.0 %  	Platelet Count - Automated : 301 K/uL  	Mean Cell Volume : 78.3 fl  	Mean Cell Hemoglobin : 27.6 pg  	Mean Cell Hemoglobin Concentration : 35.2 g/dL  	Auto Neutrophil # : x  	Auto Lymphocyte # : x  	Auto Monocyte # : x  	Auto Eosinophil # : x  	Auto Basophil # : x  	Auto Neutrophil % : 69.0 %  	Auto Lymphocyte % : 17.0 %  	Auto Monocyte % : 5.0 %  	Auto Eosinophil % : 9.0 %  	Auto Basophil % : x    	01-07    	143  |  105  |  9.0  	----------------------------<  117<H>  	3.4<L>   |  26.0  |  0.91  	Ca    9.0      07 Jan 2019 15:43  	TPro  7.8  /  Alb  5.0  /  TBili  0.3<L>  /  DBili  x   /  AST  25  /  ALT  19  /  AlkPhos  98  01-07      	Rapid Respiratory Viral Panel (01.07.19 @ 15:57)  	  Rapid RVP Result: NotDetec: This Respiratory Panel uses polymerase chain reaction (PCR) to detect for    	Blood Gas Profile - Arterial (01.07.19 @ 21:00)  	  pH, Arterial: 7.36  	  pCO2, Arterial: 41 mmHg  	  pO2, Arterial: 68 mmHg  	  HCO3, Arterial: 22 mmoL/L  	  Base Excess, Arterial: -2.1 mmol/L  	  Oxygen Saturation, Arterial: 94 %  	  Blood Gas Comments Arterial: helium tank  	  Blood Gas Source Arterial: Arterial    	< from: Xray Chest 1 View-PORTABLE IMMEDIATE (01.07.19 @ 16:08) >  	 EXAM:  XR CHEST PORTABLE IMMED 1V                        	PROCEDURE DATE:  01/07/2019    	INTERPRETATION:  History: Dyspnea  	Technique:  PA and lateral views  	Comparisons:  Chest x-ray dated 6/3/2018  	Findings:  No acute infiltrates, congestion or pleural effusions  .      	The pulmonary vasculature and aorta are normal for age. Heart size is unremarkable.   	The thorax is normal for age.  	Impression: No acute pulmonary disease.   	CALLUM BLANKENSHIP M.D., ATTENDINGRADIOLOGIST  	This document has been electronically signed. Jan 7 2019  4:26PM

## 2019-01-09 NOTE — DISCHARGE NOTE ADULT - PROVIDER TOKENS
TOKEN:'[id number]',FREE:[LAST:[Whit],FIRST:[Julianna],PHONE:[phone]],FAX:[(   )    -],ADDRESS:[[address] [place] [address]]]

## 2019-01-09 NOTE — PROGRESS NOTE ADULT - SUBJECTIVE AND OBJECTIVE BOX
PULMONARY PROGRESS NOTE      [id number]    Patient is a 23y old  Male who presents with a chief complaint of Shortness of breath (09 Jan 2019 09:51)      INTERVAL HPI/OVERNIGHT EVENTS:  Off BIPAP  feeling better  less SOB    MEDICATIONS  (STANDING):  ALBUTerol/ipratropium for Nebulization 3 milliLiter(s) Nebulizer every 4 hours  ALPRAZolam 0.25 milliGRAM(s) Oral once  influenza   Vaccine 0.5 milliLiter(s) IntraMuscular once  methylPREDNISolone sodium succinate Injectable 60 milliGRAM(s) IV Push every 6 hours  pantoprazole  Injectable 40 milliGRAM(s) IV Push daily  sodium chloride 0.9%. 1000 milliLiter(s) (75 mL/Hr) IV Continuous <Continuous>      MEDICATIONS  (PRN):  ALBUTerol    0.083% 2.5 milliGRAM(s) Nebulizer every 2 hours PRN Shortness of Breath and/or Wheezing      Allergies    No Known Drug Allergies  peanuts (Other)    Intolerances        PAST MEDICAL & SURGICAL HISTORY:  Asthma  S/P wrist surgery: right wrist at age 13.  S/P tonsillectomy and adenoidectomy: at age 9      SOCIAL HISTORY  Smoking History:       REVIEW OF SYSTEMS:    CONSTITUTIONAL:  No distress    HEENT:  Eyes:  No diplopia or blurred vision. ENT:  No earache, sore throat or runny nose.    CARDIOVASCULAR:  No pressure, squeezing, tightness, heaviness or aching about the chest; no palpitations.    RESPIRATORY:  above    GASTROINTESTINAL:  No nausea, vomiting or diarrhea.    GENITOURINARY:  No dysuria, frequency or urgency.    NEUROLOGIC:  No paresthesias, fasciculations, seizures or weakness.    Extremities: No cyanosis, clubbing or edema    PSYCHIATRIC:  No disorder of thought or mood.    Vital Signs Last 24 Hrs  T(C): 36.8 (09 Jan 2019 07:34), Max: 36.8 (09 Jan 2019 07:34)  T(F): 98.3 (09 Jan 2019 07:34), Max: 98.3 (09 Jan 2019 07:34)  HR: 74 (09 Jan 2019 07:34) (62 - 101)  BP: 118/77 (09 Jan 2019 07:34) (118/77 - 135/61)  BP(mean): --  RR: 18 (09 Jan 2019 07:34) (17 - 18)  SpO2: 94% (09 Jan 2019 09:02) (94% - 97%)    PHYSICAL EXAMINATION:    GENERAL: The patient is awake and alert in no apparent distress.     HEENT: Head is normocephalic and atraumatic. Extraocular muscles are intact. Mucous membranes are moist.    NECK: Supple.    LUNGS: improved BS with end exp wheezes    HEART: Regular rate and rhythm without murmur.    ABDOMEN: Soft, nontender, and nondistended.      EXTREMITIES: Without any cyanosis, clubbing, rash, lesions or edema.    NEUROLOGIC: Grossly intact.    LABS:                        16.3   16.5  )-----------( 264      ( 09 Jan 2019 06:59 )             45.8     01-09    141  |  102  |  21.0<H>  ----------------------------<  132<H>  4.5   |  25.0  |  0.98    Ca    10.0      09 Jan 2019 06:59  Mg     2.5     01-08    TPro  7.8  /  Alb  5.0  /  TBili  0.3<L>  /  DBili  x   /  AST  25  /  ALT  19  /  AlkPhos  98  01-07        ABG - ( 08 Jan 2019 01:12 )  pH, Arterial: 7.35  pH, Blood: x     /  pCO2: 44    /  pO2: 170   / HCO3: 23    / Base Excess: -1.9  /  SaO2: 100           Complete Blood Count + Automated Diff in AM (01.09.19 @ 06:59)    WBC Count: 16.5 K/uL    RBC Count: 5.71 M/uL    Hemoglobin: 16.3 g/dL    Hematocrit: 45.8 %    Mean Cell Volume: 80.2 fl    Mean Cell Hemoglobin: 28.5 pg    Mean Cell Hemoglobin Conc: 35.6 g/dL    Red Cell Distrib Width: 13.5 %    Platelet Count - Automated: 264 K/uL    Auto Neutrophil #: 14.3 K/uL    Auto Lymphocyte #: 1.1 K/uL    Auto Monocyte #: 1.1 K/uL    Auto Eosinophil #: 0.0 K/uL    Auto Basophil #: 0.0 K/uL    Auto Neutrophil %: 86.5: Differential percentages must be correlated with absolute numbers for  clinical significance. %    Auto Lymphocyte %: 6.5 %    Auto Monocyte %: 6.5 %    Auto Eosinophil %: 0.0 %    Auto Basophil %: 0.1 %                      MICROBIOLOGY:    RADIOLOGY & ADDITIONAL STUDIES:  < from: Xray Chest 1 View-PORTABLE IMMEDIATE (01.07.19 @ 16:08) >   EXAM:  XR CHEST PORTABLE IMMED 1V                          PROCEDURE DATE:  01/07/2019          INTERPRETATION:  History: Dyspnea    Technique:  PA and lateral views    Comparisons:  Chest x-ray dated 6/3/2018    Findings:     No acute infiltrates, congestion or pleural effusions  .      The pulmonary vasculature and aorta are normal for age. Heart size is   unremarkable.     The thorax is normal for age.    Impression: No acute pulmonary disease.                 CALLUM BLANKENSHIP M.D., ATTENDINGRADIOLOGIST  This document has been electronically signed. Jan 7 2019  4:26PM    < end of copied text >  All films reviewed on PACS

## 2019-01-09 NOTE — DISCHARGE NOTE ADULT - PLAN OF CARE
stable, resolved Be sure to take all asthma medications as prescribed, it is important that you are consistent and do not miss taking the ones that are meant to be taken daily, even if your breathing already feels well. If you have been prescribed steroids, also be sure to take those are prescribed. Be sure to follow up with your Primary Care Doctor or a Pulmonologist if you have one. If you do not already have an asthma action plan, please discuss establishing one with your outpatient Doctors. Be sure to take all asthma medications as prescribed, it is important that you are consistent and do not miss taking the ones that are meant to be taken daily, even if your breathing already feels well. If you have been prescribed steroids, also be sure to take those are prescribed. Be sure to follow up with your Primary Care Doctor or a Pulmonologist, you were followed by Dr. Mae during this hospitalization, his information is provided below. If you do not already have an asthma action plan, please discuss establishing one with your outpatient Doctors. Please stop smoking as this can exacerbate your asthma symptoms, use the nicotine patch to help. smoking cessation It is very important for your health that you stop smoking! There are many different ways to do so, nicotine patches, gum, and much more! Please discuss your various options with your Primary care doctor. There are also many online resources and hotlines that you can call. Feel free to request more information.

## 2019-01-09 NOTE — DISCHARGE NOTE ADULT - CARE PLAN
Principal Discharge DX:	Moderate persistent asthma with status asthmaticus Principal Discharge DX:	Moderate persistent asthma with status asthmaticus  Goal:	stable, resolved  Assessment and plan of treatment:	Be sure to take all asthma medications as prescribed, it is important that you are consistent and do not miss taking the ones that are meant to be taken daily, even if your breathing already feels well. If you have been prescribed steroids, also be sure to take those are prescribed. Be sure to follow up with your Primary Care Doctor or a Pulmonologist if you have one. If you do not already have an asthma action plan, please discuss establishing one with your outpatient Doctors. Principal Discharge DX:	Moderate persistent asthma with status asthmaticus  Goal:	stable, resolved  Assessment and plan of treatment:	Be sure to take all asthma medications as prescribed, it is important that you are consistent and do not miss taking the ones that are meant to be taken daily, even if your breathing already feels well. If you have been prescribed steroids, also be sure to take those are prescribed. Be sure to follow up with your Primary Care Doctor or a Pulmonologist, you were followed by Dr. Mae during this hospitalization, his information is provided below. If you do not already have an asthma action plan, please discuss establishing one with your outpatient Doctors. Please stop smoking as this can exacerbate your asthma symptoms, use the nicotine patch to help.  Secondary Diagnosis:	Smoking  Goal:	smoking cessation  Assessment and plan of treatment:	It is very important for your health that you stop smoking! There are many different ways to do so, nicotine patches, gum, and much more! Please discuss your various options with your Primary care doctor. There are also many online resources and hotlines that you can call. Feel free to request more information.

## 2019-01-09 NOTE — DISCHARGE NOTE ADULT - CARE PROVIDER_API CALL
Suman Mae), Critical Care Medicine; Internal Medicine; Pulmonary Disease  [address] [place] [address]  Phone: [phone]  Fax: [phone]    Julianna Sherman  [address] [place] [address], [place] [address]  Phone: [phone]  Fax: (       -

## 2019-01-09 NOTE — DISCHARGE NOTE ADULT - CARE PROVIDERS DIRECT ADDRESSES
[email or web address]

## 2019-01-09 NOTE — DISCHARGE NOTE ADULT - PATIENT PORTAL LINK FT
You can access the [facility] Portal, offered by [facility], by registering with the following website: [email or web address]

## 2019-05-03 NOTE — PATIENT PROFILE ADULT. - AS SC BRADEN ACTIVITY
How Severe Is Your Skin Lesion?: mild Has Your Skin Lesion Been Treated?: not been treated Is This A New Presentation, Or A Follow-Up?: Skin Lesion (3) walks occasionally

## 2019-12-11 ENCOUNTER — APPOINTMENT (OUTPATIENT)
Dept: PULMONOLOGY | Facility: CLINIC | Age: 24
End: 2019-12-11
Payer: MEDICAID

## 2019-12-11 VITALS — SYSTOLIC BLOOD PRESSURE: 110 MMHG | DIASTOLIC BLOOD PRESSURE: 70 MMHG | HEART RATE: 64 BPM | OXYGEN SATURATION: 97 %

## 2019-12-11 VITALS — BODY MASS INDEX: 21.7 KG/M2 | WEIGHT: 155 LBS | HEIGHT: 71 IN

## 2019-12-11 DIAGNOSIS — R06.09 OTHER FORMS OF DYSPNEA: ICD-10-CM

## 2019-12-11 DIAGNOSIS — J45.909 UNSPECIFIED ASTHMA, UNCOMPLICATED: ICD-10-CM

## 2019-12-11 PROCEDURE — 94060 EVALUATION OF WHEEZING: CPT

## 2019-12-11 PROCEDURE — 99214 OFFICE O/P EST MOD 30 MIN: CPT | Mod: 25

## 2019-12-11 PROCEDURE — 94664 DEMO&/EVAL PT USE INHALER: CPT | Mod: 59

## 2019-12-11 RX ORDER — ALBUTEROL SULFATE 2.5 MG/3ML
(2.5 MG/3ML) SOLUTION RESPIRATORY (INHALATION)
Qty: 3 | Refills: 3 | Status: ACTIVE | COMMUNITY
Start: 2019-12-11 | End: 1900-01-01

## 2019-12-11 RX ORDER — MONTELUKAST 10 MG/1
10 TABLET, FILM COATED ORAL
Qty: 90 | Refills: 3 | Status: ACTIVE | COMMUNITY
Start: 2019-12-11 | End: 1900-01-01

## 2019-12-11 RX ORDER — MONTELUKAST 10 MG/1
10 TABLET, FILM COATED ORAL
Qty: 90 | Refills: 1 | Status: ACTIVE | COMMUNITY
Start: 2018-03-20

## 2019-12-11 NOTE — PHYSICAL EXAM
[General Appearance - Well Developed] : well developed [General Appearance - Well Nourished] : well nourished [Normal Conjunctiva] : the conjunctiva exhibited no abnormalities [Normal Oropharynx] : normal oropharynx [Neck Appearance] : the appearance of the neck was normal [Jugular Venous Distention Increased] : there was no jugular-venous distention [Thyroid Diffuse Enlargement] : the thyroid was not enlarged [Heart Sounds] : normal S1 and S2 [Arterial Pulses Normal] : the arterial pulses were normal [Edema] : no peripheral edema present [Respiration, Rhythm And Depth] : normal respiratory rhythm and effort [Auscultation Breath Sounds / Voice Sounds] : lungs were clear to auscultation bilaterally [Lungs Percussion] : the lungs were normal to percussion [Bowel Sounds] : normal bowel sounds [Abdomen Soft] : soft [Abdomen Tenderness] : non-tender [Abnormal Walk] : normal gait [Nail Clubbing] : no clubbing of the fingernails [Cyanosis, Localized] : no localized cyanosis [Skin Turgor] : normal skin turgor [No Focal Deficits] : no focal deficits [Oriented To Time, Place, And Person] : oriented to person, place, and time [Impaired Insight] : insight and judgment were intact [Affect] : the affect was normal [Memory Recent] : recent memory was not impaired [Neck Cervical Mass (___cm)] : no neck mass was observed [Neck Circumference: ___] : neck circumference is [unfilled] [] : no hepato-splenomegaly [Elongated Uvula] : no elongated uvula [Enlarged Base of the Tongue] : no enlargement of the base of the tongue

## 2019-12-11 NOTE — HISTORY OF PRESENT ILLNESS
[FreeTextEntry1] : 23 yo male with asthma since childhood\par Intubated at age [age]\par Two exac per year\par Steroid < or = once / year\par No asa intolerance or nasal polyps. \par Hospitalized at [facility] in January of 2017\par Using albuterol MDI twice per day

## 2020-02-06 RX ORDER — ALBUTEROL SULFATE 90 UG/1
108 (90 BASE) AEROSOL, METERED RESPIRATORY (INHALATION)
Qty: 1 | Refills: 5 | Status: ACTIVE | COMMUNITY
Start: 2020-02-06 | End: 1900-01-01

## 2020-02-06 RX ORDER — ALBUTEROL SULFATE 90 UG/1
108 (90 BASE) AEROSOL, METERED RESPIRATORY (INHALATION)
Qty: 3 | Refills: 1 | Status: DISCONTINUED | COMMUNITY
Start: 2019-12-11 | End: 2020-02-06

## 2020-02-12 ENCOUNTER — APPOINTMENT (OUTPATIENT)
Dept: PULMONOLOGY | Facility: CLINIC | Age: 25
End: 2020-02-12

## 2020-03-09 RX ORDER — FLUTICASONE FUROATE AND VILANTEROL TRIFENATATE 200; 25 UG/1; UG/1
200-25 POWDER RESPIRATORY (INHALATION) DAILY
Refills: 1 | Status: DISCONTINUED | COMMUNITY
Start: 2018-03-20 | End: 2020-03-09

## 2020-03-09 RX ORDER — FLUTICASONE PROPIONATE AND SALMETEROL 250; 50 UG/1; UG/1
250-50 POWDER RESPIRATORY (INHALATION)
Qty: 1 | Refills: 6 | Status: ACTIVE | COMMUNITY
Start: 2020-03-09 | End: 1900-01-01

## 2020-03-09 RX ORDER — BUDESONIDE AND FORMOTEROL FUMARATE DIHYDRATE 160; 4.5 UG/1; UG/1
160-4.5 AEROSOL RESPIRATORY (INHALATION) TWICE DAILY
Qty: 1 | Refills: 3 | Status: DISCONTINUED | COMMUNITY
Start: 2019-12-11 | End: 2020-03-09

## 2020-11-07 ENCOUNTER — EMERGENCY (EMERGENCY)
Facility: HOSPITAL | Age: 25
LOS: 1 days | Discharge: HOME | End: 2020-11-07
Attending: EMERGENCY MEDICINE
Payer: MEDICAID

## 2020-11-07 VITALS
HEART RATE: 85 BPM | SYSTOLIC BLOOD PRESSURE: 148 MMHG | OXYGEN SATURATION: 96 % | TEMPERATURE: 98 F | HEIGHT: 72 IN | WEIGHT: 164.91 LBS | RESPIRATION RATE: 18 BRPM | DIASTOLIC BLOOD PRESSURE: 72 MMHG

## 2020-11-07 DIAGNOSIS — Z90.89 ACQUIRED ABSENCE OF OTHER ORGANS: Chronic | ICD-10-CM

## 2020-11-07 DIAGNOSIS — Z98.890 OTHER SPECIFIED POSTPROCEDURAL STATES: Chronic | ICD-10-CM

## 2020-11-07 LAB
ALBUMIN SERPL ELPH-MCNC: 4.6 G/DL — SIGNIFICANT CHANGE UP (ref 3.3–5.2)
ALP SERPL-CCNC: 70 U/L — SIGNIFICANT CHANGE UP (ref 40–120)
ALT FLD-CCNC: 19 U/L — SIGNIFICANT CHANGE UP
ANION GAP SERPL CALC-SCNC: 12 MMOL/L — SIGNIFICANT CHANGE UP (ref 5–17)
AST SERPL-CCNC: 23 U/L — SIGNIFICANT CHANGE UP
BASOPHILS # BLD AUTO: 0.03 K/UL — SIGNIFICANT CHANGE UP (ref 0–0.2)
BASOPHILS NFR BLD AUTO: 0.3 % — SIGNIFICANT CHANGE UP (ref 0–2)
BILIRUB SERPL-MCNC: 0.6 MG/DL — SIGNIFICANT CHANGE UP (ref 0.4–2)
BUN SERPL-MCNC: 12 MG/DL — SIGNIFICANT CHANGE UP (ref 8–20)
CALCIUM SERPL-MCNC: 9.6 MG/DL — SIGNIFICANT CHANGE UP (ref 8.6–10.2)
CHLORIDE SERPL-SCNC: 102 MMOL/L — SIGNIFICANT CHANGE UP (ref 98–107)
CO2 SERPL-SCNC: 23 MMOL/L — SIGNIFICANT CHANGE UP (ref 22–29)
CREAT SERPL-MCNC: 0.9 MG/DL — SIGNIFICANT CHANGE UP (ref 0.5–1.3)
EOSINOPHIL # BLD AUTO: 0.87 K/UL — HIGH (ref 0–0.5)
EOSINOPHIL NFR BLD AUTO: 9.1 % — HIGH (ref 0–6)
GLUCOSE SERPL-MCNC: 97 MG/DL — SIGNIFICANT CHANGE UP (ref 70–99)
HCT VFR BLD CALC: 44.1 % — SIGNIFICANT CHANGE UP (ref 39–50)
HGB BLD-MCNC: 15.4 G/DL — SIGNIFICANT CHANGE UP (ref 13–17)
IMM GRANULOCYTES NFR BLD AUTO: 0.7 % — SIGNIFICANT CHANGE UP (ref 0–1.5)
LIDOCAIN IGE QN: 15 U/L — LOW (ref 22–51)
LYMPHOCYTES # BLD AUTO: 2.75 K/UL — SIGNIFICANT CHANGE UP (ref 1–3.3)
LYMPHOCYTES # BLD AUTO: 28.9 % — SIGNIFICANT CHANGE UP (ref 13–44)
MCHC RBC-ENTMCNC: 28.8 PG — SIGNIFICANT CHANGE UP (ref 27–34)
MCHC RBC-ENTMCNC: 34.9 GM/DL — SIGNIFICANT CHANGE UP (ref 32–36)
MCV RBC AUTO: 82.4 FL — SIGNIFICANT CHANGE UP (ref 80–100)
MONOCYTES # BLD AUTO: 0.89 K/UL — SIGNIFICANT CHANGE UP (ref 0–0.9)
MONOCYTES NFR BLD AUTO: 9.3 % — SIGNIFICANT CHANGE UP (ref 2–14)
NEUTROPHILS # BLD AUTO: 4.91 K/UL — SIGNIFICANT CHANGE UP (ref 1.8–7.4)
NEUTROPHILS NFR BLD AUTO: 51.7 % — SIGNIFICANT CHANGE UP (ref 43–77)
PLATELET # BLD AUTO: 314 K/UL — SIGNIFICANT CHANGE UP (ref 150–400)
POTASSIUM SERPL-MCNC: 4 MMOL/L — SIGNIFICANT CHANGE UP (ref 3.5–5.3)
POTASSIUM SERPL-SCNC: 4 MMOL/L — SIGNIFICANT CHANGE UP (ref 3.5–5.3)
PROT SERPL-MCNC: 7.3 G/DL — SIGNIFICANT CHANGE UP (ref 6.6–8.7)
RBC # BLD: 5.35 M/UL — SIGNIFICANT CHANGE UP (ref 4.2–5.8)
RBC # FLD: 12.3 % — SIGNIFICANT CHANGE UP (ref 10.3–14.5)
SODIUM SERPL-SCNC: 137 MMOL/L — SIGNIFICANT CHANGE UP (ref 135–145)
WBC # BLD: 9.52 K/UL — SIGNIFICANT CHANGE UP (ref 3.8–10.5)
WBC # FLD AUTO: 9.52 K/UL — SIGNIFICANT CHANGE UP (ref 3.8–10.5)

## 2020-11-07 PROCEDURE — 80053 COMPREHEN METABOLIC PANEL: CPT

## 2020-11-07 PROCEDURE — 83690 ASSAY OF LIPASE: CPT

## 2020-11-07 PROCEDURE — 85025 COMPLETE CBC W/AUTO DIFF WBC: CPT

## 2020-11-07 PROCEDURE — 99284 EMERGENCY DEPT VISIT MOD MDM: CPT

## 2020-11-07 PROCEDURE — 36415 COLL VENOUS BLD VENIPUNCTURE: CPT

## 2020-11-07 PROCEDURE — 96375 TX/PRO/DX INJ NEW DRUG ADDON: CPT

## 2020-11-07 PROCEDURE — 96374 THER/PROPH/DIAG INJ IV PUSH: CPT

## 2020-11-07 PROCEDURE — 99284 EMERGENCY DEPT VISIT MOD MDM: CPT | Mod: 25

## 2020-11-07 RX ORDER — SODIUM CHLORIDE 9 MG/ML
1000 INJECTION INTRAMUSCULAR; INTRAVENOUS; SUBCUTANEOUS ONCE
Refills: 0 | Status: COMPLETED | OUTPATIENT
Start: 2020-11-07 | End: 2020-11-07

## 2020-11-07 RX ORDER — ONDANSETRON 8 MG/1
4 TABLET, FILM COATED ORAL ONCE
Refills: 0 | Status: COMPLETED | OUTPATIENT
Start: 2020-11-07 | End: 2020-11-07

## 2020-11-07 RX ORDER — PANTOPRAZOLE SODIUM 20 MG/1
40 TABLET, DELAYED RELEASE ORAL ONCE
Refills: 0 | Status: COMPLETED | OUTPATIENT
Start: 2020-11-07 | End: 2020-11-07

## 2020-11-07 RX ORDER — PANTOPRAZOLE SODIUM 20 MG/1
1 TABLET, DELAYED RELEASE ORAL
Qty: 14 | Refills: 0
Start: 2020-11-07 | End: 2020-11-20

## 2020-11-07 RX ORDER — ONDANSETRON 8 MG/1
1 TABLET, FILM COATED ORAL
Qty: 9 | Refills: 0
Start: 2020-11-07 | End: 2020-11-09

## 2020-11-07 RX ADMIN — ONDANSETRON 4 MILLIGRAM(S): 8 TABLET, FILM COATED ORAL at 22:36

## 2020-11-07 RX ADMIN — PANTOPRAZOLE SODIUM 40 MILLIGRAM(S): 20 TABLET, DELAYED RELEASE ORAL at 22:36

## 2020-11-07 RX ADMIN — Medication 30 MILLILITER(S): at 22:36

## 2020-11-07 RX ADMIN — SODIUM CHLORIDE 1000 MILLILITER(S): 9 INJECTION INTRAMUSCULAR; INTRAVENOUS; SUBCUTANEOUS at 22:36

## 2020-11-07 NOTE — ED ADULT TRIAGE NOTE - CHIEF COMPLAINT QUOTE
patient states that he has abdominal pain that started 2 weeks ago getting worse, states sharp that radiated to his back

## 2020-11-07 NOTE — ED PROVIDER NOTE - PROGRESS NOTE DETAILS
25 year old male with no PMHx presents to the ED for epigastric abd pain x 2 weeks, worse today associated with nausea. No fevers, chills, vomiting, diarrhea. HPI/ ROS/ PEx as stated above. Labs, Gi cocktail ordered. Pt feeling better, tolerating PO, states pain has resolved. Will d/c with Zofran and Protonix and advise GI and PMD f/u. Return precautions provided.

## 2020-11-07 NOTE — ED PROVIDER NOTE - NSFOLLOWUPINSTRUCTIONS_ED_ALL_ED_FT
Take medications as prescribed  Take Maalox (over the counter) every 6 hours as needed for pain  Follow up with Primary medical doctor in 2-3 days  Follow up with GI doctor         WHAT YOU NEED TO KNOW:    What is gastritis? Gastritis is inflammation or irritation of the lining of your stomach.     Abdominal Organs         What increases my risk for gastritis?   •Infection with bacteria, a virus, or a parasite      •NSAIDs, aspirin, or steroid medicine      •Use of tobacco products or alcohol      •Trauma such as an injury to your stomach or intestine      •Autoimmune disorders such as diabetes, thyroid disease, or Crohn disease      •Stress      •Age older than 60 years      •Illegal drugs, such as cocaine      What are the signs and symptoms of gastritis?   •Stomach pain, burning, or tenderness when you press on it      •Stomach fullness or tightness      •Nausea or vomiting      •Loss of appetite, or feeling full quickly when you eat      •Bad breath      •Fatigue or feeling more tired than usual      •Heartburn      How is gastritis diagnosed? Your healthcare provider will ask about your signs and symptoms and examine you. You may need any of the following:   •Blood tests may be used to show an infection, dehydration, or anemia (low red blood cell levels).      •A bowel movement sample may be tested for blood or the germ that may be causing your gastritis.      •A breath test may show if H pylori is causing your gastritis. You will be given a liquid to drink. Then you will breathe into a bag. Your healthcare provider will measure the amount of carbon dioxide in your breath. Extra amounts of carbon dioxide may mean you have an H pylori infection.      •An endoscopy may be used to look for irritation or bleeding in your stomach. Your healthcare provider will use an endoscope (tube with a light and camera on the end) during the procedure. He or she may take a sample from your stomach to be tested.       How is gastritis treated? Your symptoms may go away without treatment. Treatment will depend on what is causing your gastritis. Your healthcare provider may recommend changes to the medicines you take. Medicines may be given to help treat a bacterial infection or decrease stomach acid.     How can I manage or prevent gastritis?   •Do not smoke. Nicotine and other chemicals in cigarettes and cigars can make your symptoms worse and cause lung damage. Ask your healthcare provider for information if you currently smoke and need help to quit. E-cigarettes or smokeless tobacco still contain nicotine. Talk to your healthcare provider before you use these products.       •Do not drink alcohol. Alcohol can prevent healing and make your gastritis worse. Talk to your healthcare provider if you need help to stop drinking.      •Do not take NSAIDs or aspirin unless directed. These and similar medicines can cause irritation of your stomach lining. If your healthcare provider says it is okay to take NSAIDs, take them with food.       •Do not eat foods that cause irritation. Foods such as oranges and salsa can cause burning or pain. Eat a variety of healthy foods. Examples include fruits (not citrus), vegetables, low-fat dairy products, beans, whole-grain breads, and lean meats and fish. Try to eat small meals, and drink water with your meals. Do not eat for at least 3 hours before you go to bed.      •Find ways to relax and decrease stress. Stress can increase stomach acid and make gastritis worse. Activities such as yoga, meditation, or listening to music can help you relax. Spend time with friends, or do things you enjoy.      Call 911 for any of the following:   •You develop chest pain or shortness of breath.          When should I seek immediate care?   •You vomit blood.      •You have black or bloody bowel movements.      •You have severe stomach or back pain.      When should I contact my healthcare provider?   •You have a fever.      •You have new or worsening symptoms, even after treatment.      •You have questions or concerns about your condition or care.

## 2020-11-07 NOTE — ED PROVIDER NOTE - PATIENT PORTAL LINK FT
You can access the FollowMyHealth Patient Portal offered by [facility] by registering at the following website: [email or web address]. By joining [employer]’s FollowMyHealth portal, you will also be able to view your health information using other applications (apps) compatible with our system.

## 2020-11-07 NOTE — ED PROVIDER NOTE - CARE PROVIDER_API CALL
Junito Higuera  GASTROENTEROLOGY  [address], [address]  [place] [address]  Phone: [phone]  Fax: [phone]  Follow Up Time:

## 2021-01-04 ENCOUNTER — APPOINTMENT (OUTPATIENT)
Dept: GASTROENTEROLOGY | Facility: CLINIC | Age: 26
End: 2021-01-04

## 2021-01-30 NOTE — PROGRESS NOTE ADULT - ASSESSMENT
22 y/o M with PMH of uncontrolled asthma, intubated when he was 1 years old, previous hospitalizations due to asthma who presents with shortness of breath, audible wheezing and accessory respiratory muscle in the setting of poorly controlled asthma. Likely asthma exacerbation. Patient with adequate initial response to albuterol, epinephrin, mag sulfate and solu-medrol. He was placed on BiPAP overnight, reports feeling better.     Status asthmaticus, little improvement   -S/p 3 duo-nebs in the Ed. Will c/o with Duo-nebs Q 4hrs  -Start Albuterol nebs Q 2hrs PRN  -S/p Solu-medrol 125 mg IV push, c/w 60 mg Q 6hrs,   -duonebs Q4  -s/p magnesium sulfate 2g IVPB  -Oxygen therapy as needed. Now on BiPAP, settings changed to 14/8 yesterday , tolerating well (using nocturnal and PRN)  -C/w heliox therapy ordered in the ED  -ABG noted.   -pulmonary consult noted and appreciated    Anxiety:   -xanax prn     Prophylactic measure  -VCD boots, ambulate as tolerated    Dispo: pending patient progress 22 y/o M with PMH of uncontrolled asthma, intubated when he was 1 years old, previous hospitalizations due to asthma who presents with shortness of breath, audible wheezing and accessory respiratory muscle in the setting of poorly controlled asthma. Likely asthma exacerbation. Patient with adequate initial response to albuterol, epinephrin, mag sulfate and solu-medrol. He was placed on BiPAP overnight, reports feeling better.     Status asthmaticus, little improvement   -S/p 3 duo-nebs in the Ed. Will c/o with Duo-nebs Q 4hrs  -Start Albuterol nebs Q 2hrs PRN  --pulmonary consult noted and appreciated  --dc on prednisone taper, protonix, airduo   --advised to follow up with primary care/ pulmonary as op     Prophylactic measure  -VCD boots, ambulate as tolerated    Dispo: dc home today. cleared for dc by pulmonary for dc home . Statement Selected

## 2021-03-14 ENCOUNTER — EMERGENCY (EMERGENCY)
Facility: HOSPITAL | Age: 26
LOS: 1 days | Discharge: HOME | End: 2021-03-14
Attending: EMERGENCY MEDICINE
Payer: MEDICAID

## 2021-03-14 VITALS
WEIGHT: 164.91 LBS | DIASTOLIC BLOOD PRESSURE: 42 MMHG | HEIGHT: 72 IN | OXYGEN SATURATION: 99 % | RESPIRATION RATE: 18 BRPM | TEMPERATURE: 98 F | SYSTOLIC BLOOD PRESSURE: 106 MMHG | HEART RATE: 85 BPM

## 2021-03-14 DIAGNOSIS — Z90.89 ACQUIRED ABSENCE OF OTHER ORGANS: Chronic | ICD-10-CM

## 2021-03-14 DIAGNOSIS — Z98.890 OTHER SPECIFIED POSTPROCEDURAL STATES: Chronic | ICD-10-CM

## 2021-03-14 PROCEDURE — 96367 TX/PROPH/DG ADDL SEQ IV INF: CPT

## 2021-03-14 PROCEDURE — 99284 EMERGENCY DEPT VISIT MOD MDM: CPT

## 2021-03-14 PROCEDURE — 96365 THER/PROPH/DIAG IV INF INIT: CPT

## 2021-03-14 PROCEDURE — 94640 AIRWAY INHALATION TREATMENT: CPT

## 2021-03-14 PROCEDURE — 99285 EMERGENCY DEPT VISIT HI MDM: CPT | Mod: 25

## 2021-03-14 RX ORDER — IPRATROPIUM/ALBUTEROL SULFATE 18-103MCG
3 AEROSOL WITH ADAPTER (GRAM) INHALATION ONCE
Refills: 0 | Status: COMPLETED | OUTPATIENT
Start: 2021-03-14 | End: 2021-03-14

## 2021-03-14 RX ORDER — DEXAMETHASONE 0.5 MG/5ML
10 ELIXIR ORAL ONCE
Refills: 0 | Status: COMPLETED | OUTPATIENT
Start: 2021-03-14 | End: 2021-03-14

## 2021-03-14 RX ORDER — ALBUTEROL 90 UG/1
2 AEROSOL, METERED ORAL EVERY 6 HOURS
Refills: 0 | Status: DISCONTINUED | OUTPATIENT
Start: 2021-03-14 | End: 2021-03-19

## 2021-03-14 RX ORDER — ALBUTEROL 90 UG/1
2.5 AEROSOL, METERED ORAL
Refills: 0 | Status: COMPLETED | OUTPATIENT
Start: 2021-03-14 | End: 2021-03-14

## 2021-03-14 RX ORDER — MAGNESIUM SULFATE 500 MG/ML
2 VIAL (ML) INJECTION ONCE
Refills: 0 | Status: COMPLETED | OUTPATIENT
Start: 2021-03-14 | End: 2021-03-14

## 2021-03-14 RX ADMIN — ALBUTEROL 2.5 MILLIGRAM(S): 90 AEROSOL, METERED ORAL at 20:53

## 2021-03-14 RX ADMIN — ALBUTEROL 2.5 MILLIGRAM(S): 90 AEROSOL, METERED ORAL at 20:21

## 2021-03-14 RX ADMIN — Medication 2 GRAM(S): at 21:21

## 2021-03-14 RX ADMIN — Medication 50 GRAM(S): at 20:21

## 2021-03-14 RX ADMIN — Medication 3 MILLILITER(S): at 20:21

## 2021-03-14 RX ADMIN — Medication 102 MILLIGRAM(S): at 21:21

## 2021-03-14 RX ADMIN — ALBUTEROL 2 PUFF(S): 90 AEROSOL, METERED ORAL at 22:22

## 2021-03-14 RX ADMIN — ALBUTEROL 2.5 MILLIGRAM(S): 90 AEROSOL, METERED ORAL at 21:20

## 2021-03-14 RX ADMIN — Medication 10 MILLIGRAM(S): at 21:51

## 2021-03-14 NOTE — ED PROVIDER NOTE - PATIENT PORTAL LINK FT
You can access the FollowMyHealth Patient Portal offered by [facility] by registering at the following website: [email or web address]. By joining [employer]’s FollowMyHealth portal, you will also be able to view your health information using other applications (apps) compatible with our system.

## 2021-03-14 NOTE — ED PROVIDER NOTE - NSFOLLOWUPINSTRUCTIONS_ED_ALL_ED_FT
Patient education: Asthma in adults (The Basics)  View in Turkmen  Written by the doctors and editors at [facility]  What is asthma?  Asthma is a condition that can make it hard to breathe. Asthma symptoms can be mild or severe. And they can come and go. Sometimes asthma symptoms start all of a sudden. Asthma attacks happen when the airways in the lungs become narrow and inflamed (figure 1). Asthma can run in families.    How should I manage my asthma during the COVID-19 pandemic?  COVID-19 stands for "coronavirus disease 2019." It is caused by a virus called SARS-CoV-2. The virus first appeared in late 2019 and has since spread throughout the world.    People with COVID-19 can have fever, cough, and other symptoms. In severe cases, it can cause pneumonia and trouble breathing. Some people with asthma might be more likely to have serious symptoms if they get COVID-19. If you have asthma, it's especially important to take measures to avoid getting sick. This includes staying home as much as possible and washing your hands often.    If you take medicines to control your asthma or treat asthma attacks, it's important to keep taking them as usual. If you have symptoms of COVID-19, or think you might have been exposed to the virus, call your doctor or nurse.    The rest of this article has general information about asthma.    What are the symptoms of asthma?  Asthma symptoms can include:    ?Wheezing or noisy breathing    ?Coughing    ?A tight feeling in the chest    ?Shortness of breath    Symptoms can happen each day, each week, or less often. Symptoms can range from mild to severe. Although it is rare, an episode of asthma can sometimes even lead to death.    Is there a test for asthma?  Yes. Your doctor will ask you about your symptoms and have you do a breathing test to see how your lungs are working.    If your doctor thinks allergies might be making your asthma worse, they might suggest allergy testing. This can include skin tests or blood tests.    How is asthma treated?  Asthma is treated with different types of medicines. The medicines can be inhalers, liquids, or pills. Your doctor will prescribe medicine based on how often you have symptoms and how serious your symptoms are. Asthma medicines work in 1 of 2 ways:    ?Quick-relief medicines stop symptoms quickly – in 5 to 15 minutes. Almost everyone with asthma has a quick-relief inhaler that they carry with them. People use these medicines whenever they have asthma symptoms. Most people need these medicines 1 or 2 times a week – or less often. But when asthma symptoms get worse, more doses might be needed.    Some people can feel shaky after taking these medicines. A few people also need a machine called a "nebulizer" to breathe in their medicine.    ?Long-term controller medicines control asthma and prevent future symptoms. People who get asthma symptoms more than 2 times a week need to use a controller medicine 1 or 2 times each day.    Controller medicines tend to take longer to work, sometimes a few weeks. So, it can be hard to tell if the medicine is working. Keep taking the medicine, but talk to your doctor or nurse if you do not feel a medicine working.    It is very important that you take all the medicines the doctor prescribes, exactly how you are supposed to take them. You might have to take medicines a few times a day. Your doctor or nurse will show you the right way to use your inhaler.    If your symptoms get much worse all of a sudden, use your quick relief medicine and contact your doctor or nurse. You might need to go to the hospital for treatment.    What is an asthma action plan?  An asthma action plan is a list of instructions that tell you:    ?Which medicines to use each day at home    ?Which medicines to take if your symptoms get worse    ?When to get help or call for an ambulance (in the  and [place], dial [phone])    If you have frequent or severe asthma symptoms, your doctor might suggest that you have an asthma action plan. If so, you and your doctor will work together to make one. As part of your action plan, you might need to use something called a "peak flow meter." Breathing into this device will show how your lungs are working. Your doctor will show you the right way to use your peak flow meter.    Should I see a doctor or nurse?  Yes. See your doctor or nurse if you have asthma symptoms. And call your doctor or nurse if you have an asthma attack and the symptoms do not improve or get worse after using a quick-relief medicine.    If asthma symptoms are severe, call for an ambulance (in the  and [place], dial [phone]).    If you need asthma medicine every day, you should see your doctor or nurse every 6 months or more often.    Can asthma symptoms be prevented?  Yes. You can help prevent your asthma symptoms. You can stay away from things that cause your symptoms or make them worse. Doctors call these "triggers." If you know what your triggers are, avoid them as much as possible. Below are some common triggers, but your triggers might be different. Ask your doctor or nurse which are important for you:    ?Cigarette smoke – Avoid places where people smoke. If you smoke, get help to quit.    ?Exercise – Exercise can be good for people with asthma even if it is a trigger. But you might need to take an extra dose of your quick-relief inhaler medicine before you exercise. It might help to warm up before doing intense exercise. If you exercise outside on a very cold day, it can also help to wear a loose scarf or mask over your nose and mouth.    ?Dust – Mattress and pillow covers can reduce dust mites.    ?Mold – Use a dehumidifier or air conditioner to keep indoor air dry. Remove any mold you see.    ?Certain animals – These can include dogs, cats, mice, or cockroaches. If you are allergic to animals or insects, try to figure out ways to avoid them.    ?Pollen – Stay indoors when possible during pollen season. Keep your windows and doors closed whenever you can.    ?Getting sick with a cold or flu – Make sure to get a flu shot every year. Some people also need to get a vaccine to help prevent pneumonia. If you think you might have been exposed to the flu, tell your doctor or nurse. They might prescribe special medicine (called "antiviral" medicine).    ?Stress    Some adults with asthma have worse symptoms if they take aspirin or medicines called NSAIDs. NSAIDs include ibuprofen (sample brand names: Advil, Motrin) and naproxen (sample brand names: Aleve, Naprosyn). Ask your doctor if you need to avoid these medicines.    If you can't avoid certain triggers, talk with your doctor about what you can do. For example, you might need to take an extra dose of your quick-relief inhaler medicine before you exercise or are around pollen or animals you are allergic to.    What if I want to get pregnant?  If you want to get pregnant, talk to your doctor about how to control your asthma. Keeping your asthma well-controlled is important for the health of your baby. Most asthma medicines are safe to take if you are pregnant.

## 2021-03-14 NOTE — ED PROVIDER NOTE - PSH
Reported ABG results to Dr Olivier. Ordered to keep patient on bipap all night. Also reported low urinary output. No new orders because of high output during the day and daily diuretics. Ordered to change PRN ativan to Q6 PRN.    S/P tonsillectomy and adenoidectomy  at age 9  S/P wrist surgery  right wrist at age 13.

## 2021-03-14 NOTE — ED PROVIDER NOTE - CLINICAL SUMMARY MEDICAL DECISION MAKING FREE TEXT BOX
PT with stable VS, no acute distress, non toxic appearing, tolerating PO in the ED, PT with stable VS, resolution of symptoms after conservative medical intervention, Pt to be dc home with continued steroids rescue inhaler, follow up to PCP, educated about when to return to the ED if needed. PT verbalizes that he understands all instructions and results. Pt informed that ED is open and available 24/7 365 days a yr, encouraged to return to the ED if they have any change in condition, or feel the need for revaluation.

## 2021-03-14 NOTE — ED PROVIDER NOTE - PROGRESS NOTE DETAILS
Travis Segura PA-C: RESP. NO RESP DISTRESS, breathing unlabored, LUNGS CTA B/L, RR 19, SPO2 98%, with activity 98%. NO secounday mucle use, NO retractions.

## 2021-03-14 NOTE — ED PROVIDER NOTE - ATTENDING CONTRIBUTION TO CARE
seen with acp  known asthma out of inhaler  pe as doc  agree with iv mag iv steroids nebs and reassess  moving air after initial care  will reassess   agree with plan

## 2021-03-14 NOTE — ED PROVIDER NOTE - NS ED ROS FT
ROS: CONTUSIONAL: Denies fever, chills, fatigue, wt loss. HEAD: Denies trauma, HA, Dizziness. EYE: Denies Acute visual changes, diplopia. ENMT: Denies change in hearing, tinnitus, epistaxis, difficulty swallowing, sore throat. CARDIO: Denies CP, palpitations, edema. RESP: +SOB , Diff breathing, Denies Cough,  hemoptysis. GI: Denies N/V, ABD pain, change in bowel movement. URINARY: Denies difficulty urinating, pelvic pain. MS:  Denies joint pain, back pain, weakness, decreased ROM, swelling. NEURO: Denies change in gait, seizures, loss of sensation, dizziness, confusion LOC.  PSY: NO SI/HI. SKIN: Denies Rash, bruising.

## 2021-03-14 NOTE — ED PROVIDER NOTE - OBJECTIVE STATEMENT
PT with SPMHX of Asthma presents to the ED with complaint of SOB and wheezing. Pt states that he had a gradual onset of symptoms 1 wk ago that has been persistent and worsening. Pt states that has taken albuterol at home with little to no improvement and his rescue inhaler ran out today. Pt states that his SOB is moderate in nature made worse with a activity. Pt states that he has no associated pain, no recent sick exposures. Pt admits to wheezing dines fever, chills, back pian, neck pain, HA, dizziness, confusion.

## 2021-03-14 NOTE — ED PROVIDER NOTE - ADDITIONAL NOTES AND INSTRUCTIONS:
PT was evaluated At [facility] ED and was found to have a condition that warranted time of to rest and heal from WORK/SCHOOL.   Travis Segura PA-C

## 2021-04-25 ENCOUNTER — EMERGENCY (EMERGENCY)
Facility: HOSPITAL | Age: 26
LOS: 1 days | Discharge: HOME | End: 2021-04-25
Attending: EMERGENCY MEDICINE
Payer: MEDICAID

## 2021-04-25 VITALS
OXYGEN SATURATION: 97 % | TEMPERATURE: 98 F | HEIGHT: 72 IN | SYSTOLIC BLOOD PRESSURE: 126 MMHG | DIASTOLIC BLOOD PRESSURE: 80 MMHG | WEIGHT: 169.98 LBS | RESPIRATION RATE: 30 BRPM | HEART RATE: 77 BPM

## 2021-04-25 VITALS — RESPIRATION RATE: 16 BRPM | HEART RATE: 82 BPM | OXYGEN SATURATION: 98 %

## 2021-04-25 DIAGNOSIS — Z98.890 OTHER SPECIFIED POSTPROCEDURAL STATES: Chronic | ICD-10-CM

## 2021-04-25 DIAGNOSIS — Z90.89 ACQUIRED ABSENCE OF OTHER ORGANS: Chronic | ICD-10-CM

## 2021-04-25 LAB
ALBUMIN SERPL ELPH-MCNC: 4.8 G/DL — SIGNIFICANT CHANGE UP (ref 3.3–5.2)
ALP SERPL-CCNC: 95 U/L — SIGNIFICANT CHANGE UP (ref 40–120)
ALT FLD-CCNC: 24 U/L — SIGNIFICANT CHANGE UP
ANION GAP SERPL CALC-SCNC: 16 MMOL/L — SIGNIFICANT CHANGE UP (ref 5–17)
APPEARANCE UR: CLEAR — SIGNIFICANT CHANGE UP
AST SERPL-CCNC: 31 U/L — SIGNIFICANT CHANGE UP
BASOPHILS # BLD AUTO: 0.04 K/UL — SIGNIFICANT CHANGE UP (ref 0–0.2)
BASOPHILS NFR BLD AUTO: 0.5 % — SIGNIFICANT CHANGE UP (ref 0–2)
BILIRUB SERPL-MCNC: 0.5 MG/DL — SIGNIFICANT CHANGE UP (ref 0.4–2)
BILIRUB UR-MCNC: NEGATIVE — SIGNIFICANT CHANGE UP
BUN SERPL-MCNC: 12 MG/DL — SIGNIFICANT CHANGE UP (ref 8–20)
CALCIUM SERPL-MCNC: 9.8 MG/DL — SIGNIFICANT CHANGE UP (ref 8.6–10.2)
CHLORIDE SERPL-SCNC: 105 MMOL/L — SIGNIFICANT CHANGE UP (ref 98–107)
CO2 SERPL-SCNC: 22 MMOL/L — SIGNIFICANT CHANGE UP (ref 22–29)
COLOR SPEC: YELLOW — SIGNIFICANT CHANGE UP
CREAT SERPL-MCNC: 0.81 MG/DL — SIGNIFICANT CHANGE UP (ref 0.5–1.3)
DIFF PNL FLD: NEGATIVE — SIGNIFICANT CHANGE UP
EOSINOPHIL # BLD AUTO: 0.56 K/UL — HIGH (ref 0–0.5)
EOSINOPHIL NFR BLD AUTO: 6.6 % — HIGH (ref 0–6)
GLUCOSE SERPL-MCNC: 87 MG/DL — SIGNIFICANT CHANGE UP (ref 70–99)
GLUCOSE UR QL: NEGATIVE MG/DL — SIGNIFICANT CHANGE UP
HCT VFR BLD CALC: 47.4 % — SIGNIFICANT CHANGE UP (ref 39–50)
HGB BLD-MCNC: 16.9 G/DL — SIGNIFICANT CHANGE UP (ref 13–17)
IMM GRANULOCYTES NFR BLD AUTO: 0.4 % — SIGNIFICANT CHANGE UP (ref 0–1.5)
KETONES UR-MCNC: NEGATIVE — SIGNIFICANT CHANGE UP
LEUKOCYTE ESTERASE UR-ACNC: NEGATIVE — SIGNIFICANT CHANGE UP
LIDOCAIN IGE QN: 13 U/L — LOW (ref 22–51)
LYMPHOCYTES # BLD AUTO: 2.97 K/UL — SIGNIFICANT CHANGE UP (ref 1–3.3)
LYMPHOCYTES # BLD AUTO: 35.1 % — SIGNIFICANT CHANGE UP (ref 13–44)
MCHC RBC-ENTMCNC: 29.6 PG — SIGNIFICANT CHANGE UP (ref 27–34)
MCHC RBC-ENTMCNC: 35.7 GM/DL — SIGNIFICANT CHANGE UP (ref 32–36)
MCV RBC AUTO: 83.2 FL — SIGNIFICANT CHANGE UP (ref 80–100)
MONOCYTES # BLD AUTO: 0.95 K/UL — HIGH (ref 0–0.9)
MONOCYTES NFR BLD AUTO: 11.2 % — SIGNIFICANT CHANGE UP (ref 2–14)
NEUTROPHILS # BLD AUTO: 3.9 K/UL — SIGNIFICANT CHANGE UP (ref 1.8–7.4)
NEUTROPHILS NFR BLD AUTO: 46.2 % — SIGNIFICANT CHANGE UP (ref 43–77)
NITRITE UR-MCNC: NEGATIVE — SIGNIFICANT CHANGE UP
PH UR: 6 — SIGNIFICANT CHANGE UP (ref 5–8)
PLATELET # BLD AUTO: 250 K/UL — SIGNIFICANT CHANGE UP (ref 150–400)
POTASSIUM SERPL-MCNC: 4.1 MMOL/L — SIGNIFICANT CHANGE UP (ref 3.5–5.3)
POTASSIUM SERPL-SCNC: 4.1 MMOL/L — SIGNIFICANT CHANGE UP (ref 3.5–5.3)
PROT SERPL-MCNC: 7.7 G/DL — SIGNIFICANT CHANGE UP (ref 6.6–8.7)
PROT UR-MCNC: NEGATIVE MG/DL — SIGNIFICANT CHANGE UP
RBC # BLD: 5.7 M/UL — SIGNIFICANT CHANGE UP (ref 4.2–5.8)
RBC # FLD: 13.3 % — SIGNIFICANT CHANGE UP (ref 10.3–14.5)
SARS-COV-2 RNA SPEC QL NAA+PROBE: SIGNIFICANT CHANGE UP
SODIUM SERPL-SCNC: 142 MMOL/L — SIGNIFICANT CHANGE UP (ref 135–145)
SP GR SPEC: 1.01 — SIGNIFICANT CHANGE UP (ref 1.01–1.02)
UROBILINOGEN FLD QL: NEGATIVE MG/DL — SIGNIFICANT CHANGE UP
WBC # BLD: 8.45 K/UL — SIGNIFICANT CHANGE UP (ref 3.8–10.5)
WBC # FLD AUTO: 8.45 K/UL — SIGNIFICANT CHANGE UP (ref 3.8–10.5)

## 2021-04-25 PROCEDURE — 99284 EMERGENCY DEPT VISIT MOD MDM: CPT | Mod: 25

## 2021-04-25 PROCEDURE — 81003 URINALYSIS AUTO W/O SCOPE: CPT

## 2021-04-25 PROCEDURE — 83690 ASSAY OF LIPASE: CPT

## 2021-04-25 PROCEDURE — 94640 AIRWAY INHALATION TREATMENT: CPT

## 2021-04-25 PROCEDURE — U0005: CPT

## 2021-04-25 PROCEDURE — 99285 EMERGENCY DEPT VISIT HI MDM: CPT

## 2021-04-25 PROCEDURE — U0003: CPT

## 2021-04-25 PROCEDURE — 74177 CT ABD & PELVIS W/CONTRAST: CPT

## 2021-04-25 PROCEDURE — 96375 TX/PRO/DX INJ NEW DRUG ADDON: CPT

## 2021-04-25 PROCEDURE — 85025 COMPLETE CBC W/AUTO DIFF WBC: CPT

## 2021-04-25 PROCEDURE — 96374 THER/PROPH/DIAG INJ IV PUSH: CPT | Mod: XU

## 2021-04-25 PROCEDURE — 36415 COLL VENOUS BLD VENIPUNCTURE: CPT

## 2021-04-25 PROCEDURE — 74177 CT ABD & PELVIS W/CONTRAST: CPT | Mod: 26,MA

## 2021-04-25 PROCEDURE — 80053 COMPREHEN METABOLIC PANEL: CPT

## 2021-04-25 RX ORDER — ALBUTEROL 90 UG/1
2 AEROSOL, METERED ORAL
Qty: 1 | Refills: 0
Start: 2021-04-25 | End: 2021-04-29

## 2021-04-25 RX ORDER — ALBUTEROL 90 UG/1
3 AEROSOL, METERED ORAL
Qty: 180 | Refills: 0
Start: 2021-04-25 | End: 2021-05-04

## 2021-04-25 RX ORDER — MAGNESIUM SULFATE 500 MG/ML
2 VIAL (ML) INJECTION ONCE
Refills: 0 | Status: COMPLETED | OUTPATIENT
Start: 2021-04-25 | End: 2021-04-25

## 2021-04-25 RX ORDER — IPRATROPIUM/ALBUTEROL SULFATE 18-103MCG
3 AEROSOL WITH ADAPTER (GRAM) INHALATION ONCE
Refills: 0 | Status: COMPLETED | OUTPATIENT
Start: 2021-04-25 | End: 2021-04-25

## 2021-04-25 RX ORDER — ALBUTEROL 90 UG/1
3 AEROSOL, METERED ORAL
Qty: 180 | Refills: 0
Start: 2021-04-25 | End: 2023-02-07

## 2021-04-25 RX ORDER — ALBUTEROL 90 UG/1
2 AEROSOL, METERED ORAL
Qty: 1 | Refills: 0
Start: 2021-04-25 | End: 2023-02-02

## 2021-04-25 RX ORDER — IOHEXOL 300 MG/ML
30 INJECTION, SOLUTION INTRAVENOUS ONCE
Refills: 0 | Status: COMPLETED | OUTPATIENT
Start: 2021-04-25 | End: 2021-04-25

## 2021-04-25 RX ADMIN — Medication 3 MILLILITER(S): at 09:06

## 2021-04-25 RX ADMIN — Medication 3 MILLILITER(S): at 09:05

## 2021-04-25 RX ADMIN — Medication 3 MILLILITER(S): at 09:09

## 2021-04-25 RX ADMIN — Medication 50 GRAM(S): at 09:10

## 2021-04-25 RX ADMIN — Medication 125 MILLIGRAM(S): at 09:06

## 2021-04-25 RX ADMIN — IOHEXOL 30 MILLILITER(S): 300 INJECTION, SOLUTION INTRAVENOUS at 09:38

## 2021-04-25 NOTE — ED PROVIDER NOTE - PATIENT PORTAL LINK FT
You can access the FollowMyHealth Patient Portal offered by [facility] by registering at the following website: [email or web address]. By joining [employer]’s FollowMyHealth portal, you will also be able to view your health information using other applications (apps) compatible with our system.

## 2021-04-25 NOTE — ED PROVIDER NOTE - OBJECTIVE STATEMENT
24 y/o M presents with c/o difficulty breathing.  Patient with hx of asthma, ran out of nebulizer and ventolin 2 days ago.  He has had progressive JARA SOB, increased WOB, current smoker.  Woke up with RLQ abdominal pain, no N/V/D, fever

## 2021-04-25 NOTE — ED ADULT TRIAGE NOTE - CHIEF COMPLAINT QUOTE
25 M c/o asthma exac x 2 days, pt tachypneic, immediately brought back for neb, Michaelle HARRY NP aware at bedside.

## 2021-04-25 NOTE — ED PROVIDER NOTE - NS ED ROS FT
General: no fever or chills  Eyes: no redness, discharge,  no visual disturbances  ENT: no nasal congestion, sore throat  Cardiac: No chest pain, palpitations, peripheral edema  Respiratory: + cough, JARA, SOB and wheeze  GI: + RLQ abdominal pain, no N/V/D  : no dysuria, hematuria  Musculoskeletal: no joint pain, no back or neck pain  Neuro: No headache or dizziness  Skin: no itch or rash

## 2021-04-25 NOTE — ED PROVIDER NOTE - PROGRESS NOTE DETAILS
NP NOTE:  Patients breathing improved, RR 16, no use of accessory muscles.  Lungs with mild wheeze at bases.  CT scan without evidence of acute appendicitis.  Appendix borderline in size, patient advised to return if he develops fever, N/N, intensified pain.  Will refer to [facility] for follow up care.

## 2021-04-25 NOTE — ED PROVIDER NOTE - PHYSICAL EXAMINATION
Constitutional: Awake and alert, in NAD  Eyes: clear bilaterally  Cardiac: S1S2, RR, no murmur  Resp: + diffuse inspiratory and expiratory wheeze, tachypnea, no use of accessory muscles  GI: +BS x 4, soft, RLQ pain to palpation, no rebound or guarding  MSK:  no bony deformity, no limited ROM  Neuro: A&Ox3, CN II-XI GI, ESCALANTE x4, 5/5 motors throughout, = sensation  Skin: warm and dry

## 2021-04-25 NOTE — ED PROVIDER NOTE - NSFOLLOWUPINSTRUCTIONS_ED_ALL_ED_FT
- start steroids tomorrow  - use inhaler and nebulizer as needed  -call to make an appointment with [facility] for followup care in 1-2 weeks  - if you develop fever, chills, nausea or vomiting, increased pain of right lower abdomin return to ED.  - your COVID test is not back yet, quarantine until you get the results back.

## 2021-04-25 NOTE — ED PROVIDER NOTE - ATTENDING CONTRIBUTION TO CARE
26 yo male with hx asthna, ran out of meds  also abd pain  nofever or chills  nad  mild wheezing b/l  abd soft, RLQ tenderness, no rebound or guarding    nebs, steroids, mag  will check labs, ct

## 2021-04-25 NOTE — ED ADULT NURSE NOTE - OBJECTIVE STATEMENT
24 y/o m a&xo3 comes to ED c/o asthma exacerbation x2 days. pt. states he has albuterol but he ran out. pt. c/o slight rt. sided abdominal pain starting this morning. pt. denies n/v/d/fevers.

## 2021-05-21 ENCOUNTER — EMERGENCY (EMERGENCY)
Facility: HOSPITAL | Age: 26
LOS: 1 days | Discharge: HOME | End: 2021-05-21
Attending: EMERGENCY MEDICINE
Payer: MEDICAID

## 2021-05-21 VITALS
SYSTOLIC BLOOD PRESSURE: 149 MMHG | OXYGEN SATURATION: 94 % | RESPIRATION RATE: 36 BRPM | HEART RATE: 96 BPM | HEIGHT: 72 IN | DIASTOLIC BLOOD PRESSURE: 83 MMHG | WEIGHT: 169.98 LBS

## 2021-05-21 DIAGNOSIS — Z98.890 OTHER SPECIFIED POSTPROCEDURAL STATES: Chronic | ICD-10-CM

## 2021-05-21 DIAGNOSIS — Z90.89 ACQUIRED ABSENCE OF OTHER ORGANS: Chronic | ICD-10-CM

## 2021-05-21 LAB
ALBUMIN SERPL ELPH-MCNC: 4.7 G/DL — SIGNIFICANT CHANGE UP (ref 3.3–5.2)
ALP SERPL-CCNC: 92 U/L — SIGNIFICANT CHANGE UP (ref 40–120)
ALT FLD-CCNC: 23 U/L — SIGNIFICANT CHANGE UP
ANION GAP SERPL CALC-SCNC: 13 MMOL/L — SIGNIFICANT CHANGE UP (ref 5–17)
AST SERPL-CCNC: 42 U/L — HIGH
BASOPHILS # BLD AUTO: 0.03 K/UL — SIGNIFICANT CHANGE UP (ref 0–0.2)
BASOPHILS NFR BLD AUTO: 0.3 % — SIGNIFICANT CHANGE UP (ref 0–2)
BILIRUB SERPL-MCNC: 0.7 MG/DL — SIGNIFICANT CHANGE UP (ref 0.4–2)
BUN SERPL-MCNC: 12 MG/DL — SIGNIFICANT CHANGE UP (ref 8–20)
CALCIUM SERPL-MCNC: 9.5 MG/DL — SIGNIFICANT CHANGE UP (ref 8.6–10.2)
CHLORIDE SERPL-SCNC: 104 MMOL/L — SIGNIFICANT CHANGE UP (ref 98–107)
CO2 SERPL-SCNC: 22 MMOL/L — SIGNIFICANT CHANGE UP (ref 22–29)
CREAT SERPL-MCNC: 0.77 MG/DL — SIGNIFICANT CHANGE UP (ref 0.5–1.3)
EOSINOPHIL # BLD AUTO: 0.53 K/UL — HIGH (ref 0–0.5)
EOSINOPHIL NFR BLD AUTO: 5 % — SIGNIFICANT CHANGE UP (ref 0–6)
GLUCOSE SERPL-MCNC: 106 MG/DL — HIGH (ref 70–99)
HCT VFR BLD CALC: 41.4 % — SIGNIFICANT CHANGE UP (ref 39–50)
HGB BLD-MCNC: 14.7 G/DL — SIGNIFICANT CHANGE UP (ref 13–17)
IMM GRANULOCYTES NFR BLD AUTO: 0.3 % — SIGNIFICANT CHANGE UP (ref 0–1.5)
LYMPHOCYTES # BLD AUTO: 2.28 K/UL — SIGNIFICANT CHANGE UP (ref 1–3.3)
LYMPHOCYTES # BLD AUTO: 21.6 % — SIGNIFICANT CHANGE UP (ref 13–44)
MCHC RBC-ENTMCNC: 29.8 PG — SIGNIFICANT CHANGE UP (ref 27–34)
MCHC RBC-ENTMCNC: 35.5 GM/DL — SIGNIFICANT CHANGE UP (ref 32–36)
MCV RBC AUTO: 84 FL — SIGNIFICANT CHANGE UP (ref 80–100)
MONOCYTES # BLD AUTO: 1.23 K/UL — HIGH (ref 0–0.9)
MONOCYTES NFR BLD AUTO: 11.7 % — SIGNIFICANT CHANGE UP (ref 2–14)
NEUTROPHILS # BLD AUTO: 6.44 K/UL — SIGNIFICANT CHANGE UP (ref 1.8–7.4)
NEUTROPHILS NFR BLD AUTO: 61.1 % — SIGNIFICANT CHANGE UP (ref 43–77)
PLATELET # BLD AUTO: 240 K/UL — SIGNIFICANT CHANGE UP (ref 150–400)
POTASSIUM SERPL-MCNC: 3.5 MMOL/L — SIGNIFICANT CHANGE UP (ref 3.5–5.3)
POTASSIUM SERPL-SCNC: 3.5 MMOL/L — SIGNIFICANT CHANGE UP (ref 3.5–5.3)
PROT SERPL-MCNC: 7.5 G/DL — SIGNIFICANT CHANGE UP (ref 6.6–8.7)
RBC # BLD: 4.93 M/UL — SIGNIFICANT CHANGE UP (ref 4.2–5.8)
RBC # FLD: 12.7 % — SIGNIFICANT CHANGE UP (ref 10.3–14.5)
SODIUM SERPL-SCNC: 138 MMOL/L — SIGNIFICANT CHANGE UP (ref 135–145)
WBC # BLD: 10.54 K/UL — HIGH (ref 3.8–10.5)
WBC # FLD AUTO: 10.54 K/UL — HIGH (ref 3.8–10.5)

## 2021-05-21 PROCEDURE — 71045 X-RAY EXAM CHEST 1 VIEW: CPT | Mod: 26

## 2021-05-21 PROCEDURE — 99284 EMERGENCY DEPT VISIT MOD MDM: CPT

## 2021-05-21 RX ORDER — IPRATROPIUM/ALBUTEROL SULFATE 18-103MCG
3 AEROSOL WITH ADAPTER (GRAM) INHALATION ONCE
Refills: 0 | Status: COMPLETED | OUTPATIENT
Start: 2021-05-21 | End: 2021-05-21

## 2021-05-21 RX ORDER — MAGNESIUM SULFATE 500 MG/ML
2 VIAL (ML) INJECTION ONCE
Refills: 0 | Status: COMPLETED | OUTPATIENT
Start: 2021-05-21 | End: 2021-05-21

## 2021-05-21 RX ADMIN — Medication 3 MILLILITER(S): at 23:34

## 2021-05-21 RX ADMIN — Medication 50 GRAM(S): at 22:34

## 2021-05-21 RX ADMIN — Medication 125 MILLIGRAM(S): at 22:34

## 2021-05-21 RX ADMIN — Medication 3 MILLILITER(S): at 22:34

## 2021-05-21 RX ADMIN — Medication 3 MILLILITER(S): at 22:26

## 2021-05-21 NOTE — ED PROVIDER NOTE - OBJECTIVE STATEMENT
26 yo M hx asthma, intubation as a child but not since, p/w asthma exacerbation since yesterday. patient ran out of his inhaler. no fever. no productive cough. patient found tachypnic and hypoxic with diffuse wheezing.

## 2021-05-21 NOTE — ED PROVIDER NOTE - NS ED ROS FT
Review of Systems  •	CONSTITUTIONAL - no  fever, no diaphoresis, no weight change  •	SKIN - no rash  •	HEMATOLOGIC - no bleeding, no bruising  •	EYES - no eye pain, no blurred vision  •	ENT - no change in hearing, no pain  •	RESPIRATORY - (+) shortness of breath, no cough  •	CARDIAC - no  chest pain, no palpitations  •	GI - no abd pain, no nausea, no vomiting, no diarrhea, no constipation, no bleeding  •	GENITO-URINARY - no discharge, no dysuria; no hematuria,   •	ENDO - no polydipsia, no polyuria, no heat/no cold intolerance  •	MUSCULOSKELETAL - no joint pain, no swelling, no redness  •	NEUROLOGIC - no weakness, no headache, no anesthesia, no paresthesias  •	PSYCH - no anxiety, non suicidal, non homicidal, no hallucination, no depression

## 2021-05-21 NOTE — ED PROVIDER NOTE - CLINICAL SUMMARY MEDICAL DECISION MAKING FREE TEXT BOX
24 yo M p/w asthama exacerbation. duobneb x 3, magensium 2 g, solumderol 125mg iv. 26 yo M p/w asthama exacerbation. duobneb x 3, magensium 2 g, solumderol 125mg iv. with improvement. patient off O2, 97%. comfortable breathing but still have diffuse wheezing. will place in Obs.

## 2021-05-21 NOTE — ED PROVIDER NOTE - PHYSICAL EXAMINATION
VITAL SIGNS: I have reviewed nursing notes and confirm.  CONSTITUTIONAL: Well-developed; well-nourished; in no acute distress.  SKIN: Skin exam is warm and dry, no acute rash.  HEAD: Normocephalic; atraumatic.  EYES: PERRL, EOM intact; conjunctiva and sclera clear.  ENT: No nasal discharge; airway clear. Throat clear.  NECK: Supple; non tender.    CARD: S1, S2 normal; Regular rate and rhythm.  RESP: (+)  wheezes b/l (+) tachypnic, (+)abdominal breathing (+) tri-pod position.   ABD:  soft; non-distended; non-tender;   EXT: Normal ROM. No clubbing, cyanosis or edema.  NEURO: Alert, oriented. Grossly unremarkable. No focal deficits. no facial droop, moves all extremities,  normal gait   PSYCH: Cooperative, appropriate.

## 2021-05-21 NOTE — ED ADULT NURSE NOTE - OBJECTIVE STATEMENT
Patient A&O presents to ED c/o difficulty breathing.  Patient has hx of asthma.  Patient reports feeling short of breath for last x2 days but worsening tonight.  Patient states he ran out of his inhaler.  Patient denies cough and fevers.  Patient noted to be tachypneic with audible wheeze.  Patient difficulty speaking in full sentences.  Patient placed in negative pressure room for nebs treatments.

## 2021-05-22 VITALS
SYSTOLIC BLOOD PRESSURE: 143 MMHG | TEMPERATURE: 98 F | HEART RATE: 86 BPM | OXYGEN SATURATION: 96 % | RESPIRATION RATE: 20 BRPM | DIASTOLIC BLOOD PRESSURE: 80 MMHG

## 2021-05-22 PROCEDURE — G0378: CPT

## 2021-05-22 PROCEDURE — 96365 THER/PROPH/DIAG IV INF INIT: CPT

## 2021-05-22 PROCEDURE — 96376 TX/PRO/DX INJ SAME DRUG ADON: CPT

## 2021-05-22 PROCEDURE — 93005 ELECTROCARDIOGRAM TRACING: CPT

## 2021-05-22 PROCEDURE — 80053 COMPREHEN METABOLIC PANEL: CPT

## 2021-05-22 PROCEDURE — 99285 EMERGENCY DEPT VISIT HI MDM: CPT | Mod: 25

## 2021-05-22 PROCEDURE — 99236 HOSP IP/OBS SAME DATE HI 85: CPT

## 2021-05-22 PROCEDURE — 36415 COLL VENOUS BLD VENIPUNCTURE: CPT

## 2021-05-22 PROCEDURE — 96375 TX/PRO/DX INJ NEW DRUG ADDON: CPT

## 2021-05-22 PROCEDURE — 85025 COMPLETE CBC W/AUTO DIFF WBC: CPT

## 2021-05-22 PROCEDURE — 93010 ELECTROCARDIOGRAM REPORT: CPT

## 2021-05-22 PROCEDURE — 71045 X-RAY EXAM CHEST 1 VIEW: CPT

## 2021-05-22 PROCEDURE — 94640 AIRWAY INHALATION TREATMENT: CPT

## 2021-05-22 RX ORDER — IPRATROPIUM/ALBUTEROL SULFATE 18-103MCG
3 AEROSOL WITH ADAPTER (GRAM) INHALATION EVERY 4 HOURS
Refills: 0 | Status: DISCONTINUED | OUTPATIENT
Start: 2021-05-22 | End: 2021-05-26

## 2021-05-22 RX ORDER — BUDESONIDE AND FORMOTEROL FUMARATE DIHYDRATE 160; 4.5 UG/1; UG/1
2 AEROSOL RESPIRATORY (INHALATION)
Qty: 1 | Refills: 0
Start: 2021-05-22 | End: 2021-05-31

## 2021-05-22 RX ORDER — ALBUTEROL 90 UG/1
2 AEROSOL, METERED ORAL
Qty: 1 | Refills: 0
Start: 2021-05-22 | End: 2021-05-31

## 2021-05-22 RX ORDER — MAGNESIUM SULFATE 500 MG/ML
2 VIAL (ML) INJECTION DAILY
Refills: 0 | Status: DISCONTINUED | OUTPATIENT
Start: 2021-05-22 | End: 2021-05-26

## 2021-05-22 RX ADMIN — Medication 40 MILLIGRAM(S): at 12:11

## 2021-05-22 RX ADMIN — Medication 40 MILLIGRAM(S): at 05:51

## 2021-05-22 RX ADMIN — Medication 40 MILLIGRAM(S): at 18:14

## 2021-05-22 RX ADMIN — Medication 3 MILLILITER(S): at 05:42

## 2021-05-22 RX ADMIN — Medication 3 MILLILITER(S): at 16:15

## 2021-05-22 RX ADMIN — Medication 3 MILLILITER(S): at 12:11

## 2021-05-22 RX ADMIN — Medication 3 MILLILITER(S): at 08:54

## 2021-05-22 RX ADMIN — Medication 2 GRAM(S): at 13:00

## 2021-05-22 RX ADMIN — Medication 50 GRAM(S): at 12:11

## 2021-05-22 NOTE — ED ADULT NURSE REASSESSMENT NOTE - NS ED NURSE REASSESS COMMENT FT1
Pt A&OX3, amb ad conner, denies any pain.  O2 sat 97% on RA.  Pt still wheezing bilat.   maintained.

## 2021-05-22 NOTE — ED CDU PROVIDER INITIAL DAY NOTE - MEDICAL DECISION MAKING DETAILS
26yo M with asthma exacerbation, will keep in obs for serial nebs, steroids and re-assess for symptomatic improvement

## 2021-05-22 NOTE — ED ADULT NURSE REASSESSMENT NOTE - NS ED NURSE REASSESS COMMENT FT1
Pt received @ 0730, A&OX3, amb ad conner, pt reports improvement since coming into ED.  Pt noted to be sob after ambulating a few steps to the BR in room, O2 sat of 96% on RA after short walk.  Wheezing heard bilat, abd soft nondistended, nontender, moving al ext well.  CM in place, NSR.  VSS.

## 2021-05-22 NOTE — ED CDU PROVIDER INITIAL DAY NOTE - NS ED ROS FT
Gen: denies fever, chills, fatigue, weight loss  Skin: denies rashes, laceration, bruising  HEENT: denies visual changes, ear pain, nasal congestion, throat pain  Respiratory: +SOB, +Wheezing. Denies cough  Cardiovascular: denies chest pain, palpitations, diaphoresis, LE edema  GI: denies abdominal pain, n/v/d  : denies dysuria, frequency, urgency, bowel/bladder incontinence  MSK: denies joint swelling/pain, back pain, neck pain  Neuro: denies headache, dizziness, weakness, numbness  Psych: denies anxiety, depression, SI/HI, visual/auditory hallucinations

## 2021-05-22 NOTE — ED CDU PROVIDER INITIAL DAY NOTE - OBJECTIVE STATEMENT
26yo M pmhx asthma (intubated as a child but not since) presented to ED with asthma exacerbation. Pt states symptoms started yesterday morning, but worsened today while at work. 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 Initially presented to ED tachypneic and hypoxic, treated with duonebs x 3, steroids and mag with improvement. Pt put in obs for further management of symptoms. States he is current everyday cigarette and marijuana smoker. Denies cp, fever, cough, n/v.

## 2021-05-22 NOTE — ED ADULT NURSE REASSESSMENT NOTE - NS ED NURSE REASSESS COMMENT FT1
Patient resting in stretcher.  Oxygen saturation 98% on RA, no signs of distress at this time.  Safety maintained.

## 2021-05-22 NOTE — ED CDU PROVIDER INITIAL DAY NOTE - PHYSICAL EXAMINATION
Gen: No acute distress, non toxic  HEENT: Mucous membranes moist, pink conjunctivae, EOMI  CV: RRR, nl s1/s2.  Resp: Diffuse inspiratory and expiratory wheezing, no tachypnea.   GI: Abdomen soft, NT, ND. No rebound, no guarding  : No CVAT  Neuro: A&O x 3, moving all 4 extremities  MSK: No spine or joint tenderness to palpation  Skin: No rashes. intact and perfused.

## 2021-05-22 NOTE — ED CDU PROVIDER DISPOSITION NOTE - PATIENT PORTAL LINK FT
You can access the FollowMyHealth Patient Portal offered by [facility] by registering at the following website: [email or web address]. By joining [employer]’s FollowMyHealth portal, you will also be able to view your health information using other applications (apps) compatible with our system.

## 2021-05-22 NOTE — ED CDU PROVIDER DISPOSITION NOTE - CLINICAL COURSE
26yo M pmhx asthma (intubated as a child but not since) presented to ED with asthma exacerbation. Pt placed in CDU and treated with serial nebs / solumedrol and had improvement in symptoms. Pt with ambulatory O2 sat of 97% at time of discharge. Pt given pulm follow up and strict return precautions. Will treat with course of Symbicort, steroids, and rescue inhaler.

## 2021-05-22 NOTE — ED CDU PROVIDER INITIAL DAY NOTE - ATTENDING CONTRIBUTION TO CARE
I, Stone Bishop, have personally performed a face to face diagnostic evaluation on this patient. I have reviewed the ACP note and agree with the history, exam and plan of care, except as noted.    26 yo M p/w asthma exacerbation. duobneb x 3, magnesium 2 g, solumderol 125mg iv. with improvement. patient off O2, 97%. comfortable breathing but still have diffuse wheezing. Placed in Obs for nebs and steroid.

## 2021-05-22 NOTE — ED CDU PROVIDER DISPOSITION NOTE - NSFOLLOWUPINSTRUCTIONS_ED_ALL_ED_FT
Asthma    Asthma is a condition in which the airways tighten and narrow, making it difficult to breath. Asthma episodes, also called asthma attacks, range from minor to life-threatening. Symptoms include wheezing, coughing, chest tightness, or shortness of breath. The diagnosis of asthma is made by a review of your medical history and a physical exam, but may involve additional testing. Asthma cannot be cured, but medicines and lifestyle changes can help control it. Avoid triggers of asthma which may include animal dander, pollen, mold, smoke, air pollutants, etc.     SEEK IMMEDIATE MEDICAL CARE IF YOU HAVE ANY OF THE FOLLOWING SYMPTOMS: worsening of symptoms, shortness of breath at rest, chest pain, bluish discoloration to lips or fingertips, lightheadedness/dizziness, or fever.    Follow up with Dr. Hanson

## 2021-05-24 ENCOUNTER — APPOINTMENT (OUTPATIENT)
Dept: PULMONOLOGY | Facility: CLINIC | Age: 26
End: 2021-05-24

## 2021-05-26 ENCOUNTER — APPOINTMENT (OUTPATIENT)
Dept: PULMONOLOGY | Facility: CLINIC | Age: 26
End: 2021-05-26

## 2021-08-14 ENCOUNTER — TRANSCRIPTION ENCOUNTER (OUTPATIENT)
Age: 26
End: 2021-08-14

## 2021-12-22 ENCOUNTER — EMERGENCY (EMERGENCY)
Facility: HOSPITAL | Age: 26
LOS: 1 days | Discharge: HOME | End: 2021-12-22
Attending: EMERGENCY MEDICINE
Payer: MEDICAID

## 2021-12-22 VITALS
SYSTOLIC BLOOD PRESSURE: 120 MMHG | WEIGHT: 169.98 LBS | TEMPERATURE: 98 F | RESPIRATION RATE: 25 BRPM | HEART RATE: 97 BPM | OXYGEN SATURATION: 96 % | HEIGHT: 72 IN | DIASTOLIC BLOOD PRESSURE: 71 MMHG

## 2021-12-22 DIAGNOSIS — Z98.890 OTHER SPECIFIED POSTPROCEDURAL STATES: Chronic | ICD-10-CM

## 2021-12-22 DIAGNOSIS — Z90.89 ACQUIRED ABSENCE OF OTHER ORGANS: Chronic | ICD-10-CM

## 2021-12-22 LAB
ANION GAP SERPL CALC-SCNC: 15 MMOL/L — SIGNIFICANT CHANGE UP (ref 5–17)
BASOPHILS # BLD AUTO: 0.05 K/UL — SIGNIFICANT CHANGE UP (ref 0–0.2)
BASOPHILS NFR BLD AUTO: 0.6 % — SIGNIFICANT CHANGE UP (ref 0–2)
BUN SERPL-MCNC: 9.6 MG/DL — SIGNIFICANT CHANGE UP (ref 8–20)
CALCIUM SERPL-MCNC: 9.5 MG/DL — SIGNIFICANT CHANGE UP (ref 8.6–10.2)
CHLORIDE SERPL-SCNC: 106 MMOL/L — SIGNIFICANT CHANGE UP (ref 98–107)
CO2 SERPL-SCNC: 20 MMOL/L — LOW (ref 22–29)
CREAT SERPL-MCNC: 0.83 MG/DL — SIGNIFICANT CHANGE UP (ref 0.5–1.3)
EOSINOPHIL # BLD AUTO: 0.81 K/UL — HIGH (ref 0–0.5)
EOSINOPHIL NFR BLD AUTO: 10.2 % — HIGH (ref 0–6)
GLUCOSE SERPL-MCNC: 87 MG/DL — SIGNIFICANT CHANGE UP (ref 70–99)
HCT VFR BLD CALC: 46.1 % — SIGNIFICANT CHANGE UP (ref 39–50)
HGB BLD-MCNC: 16.1 G/DL — SIGNIFICANT CHANGE UP (ref 13–17)
IMM GRANULOCYTES NFR BLD AUTO: 0.4 % — SIGNIFICANT CHANGE UP (ref 0–1.5)
LYMPHOCYTES # BLD AUTO: 2.36 K/UL — SIGNIFICANT CHANGE UP (ref 1–3.3)
LYMPHOCYTES # BLD AUTO: 29.8 % — SIGNIFICANT CHANGE UP (ref 13–44)
MCHC RBC-ENTMCNC: 29.5 PG — SIGNIFICANT CHANGE UP (ref 27–34)
MCHC RBC-ENTMCNC: 34.9 GM/DL — SIGNIFICANT CHANGE UP (ref 32–36)
MCV RBC AUTO: 84.6 FL — SIGNIFICANT CHANGE UP (ref 80–100)
MONOCYTES # BLD AUTO: 0.8 K/UL — SIGNIFICANT CHANGE UP (ref 0–0.9)
MONOCYTES NFR BLD AUTO: 10.1 % — SIGNIFICANT CHANGE UP (ref 2–14)
NEUTROPHILS # BLD AUTO: 3.86 K/UL — SIGNIFICANT CHANGE UP (ref 1.8–7.4)
NEUTROPHILS NFR BLD AUTO: 48.9 % — SIGNIFICANT CHANGE UP (ref 43–77)
PLATELET # BLD AUTO: 250 K/UL — SIGNIFICANT CHANGE UP (ref 150–400)
POTASSIUM SERPL-MCNC: 4.2 MMOL/L — SIGNIFICANT CHANGE UP (ref 3.5–5.3)
POTASSIUM SERPL-SCNC: 4.2 MMOL/L — SIGNIFICANT CHANGE UP (ref 3.5–5.3)
RAPID RVP RESULT: SIGNIFICANT CHANGE UP
RBC # BLD: 5.45 M/UL — SIGNIFICANT CHANGE UP (ref 4.2–5.8)
RBC # FLD: 12.5 % — SIGNIFICANT CHANGE UP (ref 10.3–14.5)
SARS-COV-2 RNA SPEC QL NAA+PROBE: SIGNIFICANT CHANGE UP
SODIUM SERPL-SCNC: 141 MMOL/L — SIGNIFICANT CHANGE UP (ref 135–145)
WBC # BLD: 7.91 K/UL — SIGNIFICANT CHANGE UP (ref 3.8–10.5)
WBC # FLD AUTO: 7.91 K/UL — SIGNIFICANT CHANGE UP (ref 3.8–10.5)

## 2021-12-22 PROCEDURE — 80048 BASIC METABOLIC PNL TOTAL CA: CPT

## 2021-12-22 PROCEDURE — 94640 AIRWAY INHALATION TREATMENT: CPT

## 2021-12-22 PROCEDURE — 36415 COLL VENOUS BLD VENIPUNCTURE: CPT

## 2021-12-22 PROCEDURE — 96365 THER/PROPH/DIAG IV INF INIT: CPT

## 2021-12-22 PROCEDURE — 0225U NFCT DS DNA&RNA 21 SARSCOV2: CPT

## 2021-12-22 PROCEDURE — 99285 EMERGENCY DEPT VISIT HI MDM: CPT | Mod: 25

## 2021-12-22 PROCEDURE — 99285 EMERGENCY DEPT VISIT HI MDM: CPT

## 2021-12-22 PROCEDURE — 96375 TX/PRO/DX INJ NEW DRUG ADDON: CPT

## 2021-12-22 PROCEDURE — 85025 COMPLETE CBC W/AUTO DIFF WBC: CPT

## 2021-12-22 RX ORDER — IPRATROPIUM/ALBUTEROL SULFATE 18-103MCG
3 AEROSOL WITH ADAPTER (GRAM) INHALATION
Qty: 36 | Refills: 0
Start: 2021-12-22 | End: 2021-12-24

## 2021-12-22 RX ORDER — ALBUTEROL 90 UG/1
2 AEROSOL, METERED ORAL
Qty: 1 | Refills: 0
Start: 2021-12-22 | End: 2021-12-28

## 2021-12-22 RX ORDER — MAGNESIUM SULFATE 500 MG/ML
2 VIAL (ML) INJECTION ONCE
Refills: 0 | Status: COMPLETED | OUTPATIENT
Start: 2021-12-22 | End: 2021-12-22

## 2021-12-22 RX ORDER — IPRATROPIUM/ALBUTEROL SULFATE 18-103MCG
3 AEROSOL WITH ADAPTER (GRAM) INHALATION
Refills: 0 | Status: COMPLETED | OUTPATIENT
Start: 2021-12-22 | End: 2021-12-22

## 2021-12-22 RX ADMIN — Medication 3 MILLILITER(S): at 09:20

## 2021-12-22 RX ADMIN — Medication 3 MILLILITER(S): at 08:44

## 2021-12-22 RX ADMIN — Medication 150 GRAM(S): at 08:30

## 2021-12-22 RX ADMIN — Medication 125 MILLIGRAM(S): at 08:30

## 2021-12-22 RX ADMIN — Medication 2 GRAM(S): at 09:20

## 2021-12-22 RX ADMIN — Medication 3 MILLILITER(S): at 08:30

## 2021-12-22 NOTE — ED PROVIDER NOTE - OBJECTIVE STATEMENT
25yo M with asthma, chornically on steroids, helped someone with cleaning a basement last 2 days with dust and today with SOB, +dry cough. no fever. UNVACCINATED for COVID. +SOB worse on exertion. no CP

## 2021-12-22 NOTE — ED PROVIDER NOTE - PATIENT PORTAL LINK FT
You can access the FollowMyHealth Patient Portal offered by [facility] by registering at the following website: [email or web address]. By joining [employer]’s FollowMyHealth portal, you will also be able to view your health information using other applications (apps) compatible with our system.

## 2021-12-22 NOTE — ED ADULT NURSE NOTE - OBJECTIVE STATEMENT
26y male AOx4 c/o asthma exacerbation. pt states he was recently working in a basement and feels the dust triggered his asthma. pt states he normally takes steroids and inhaler for asthma, but ran out of medication. pt present tachypneic, audible wheezing noted, chest tightness. O2 sat 96% on room air. abd soft and nondistended. skin WNL for race. pt has not received covid vaccine.

## 2021-12-22 NOTE — ED ADULT TRIAGE NOTE - CHIEF COMPLAINT QUOTE
pt with hx asthma, states to have run out of medication with no refills. resp labored, able to speak in three word sentences.

## 2021-12-22 NOTE — ED ADULT NURSE NOTE - NSICDXFAMILYHX_GEN_ALL_CORE_FT
FAMILY HISTORY:  Father  Still living? Unknown  Family history of asthma, Age at diagnosis: Age Unknown    Mother  Still living? Unknown  Family history of asthma, Age at diagnosis: Age Unknown  Family history of bipolar disorder, Age at diagnosis: Age Unknown

## 2021-12-22 NOTE — ED PROVIDER NOTE - PHYSICAL EXAMINATION
Gen: NAD, AOx3  Head: NCAT  HEENT: EOMI, oral mucosa moist, normal conjunctiva, neck supple  Lung: decreased air entry with diffuse wheeze  CV: rrr, no murmur, Normal perfusion  Abd: soft, NTND  MSK: No edema, no visible deformities  Neuro: No focal neurologic deficits  Skin: No rash   Psych: normal affect

## 2021-12-22 NOTE — ED PROVIDER NOTE - NSICDXPASTSURGICALHX_GEN_ALL_CORE_FT
PAST SURGICAL HISTORY:  S/P tonsillectomy and adenoidectomy at age 9    S/P wrist surgery right wrist at age 13.

## 2021-12-22 NOTE — ED PROVIDER NOTE - NSFOLLOWUPINSTRUCTIONS_ED_ALL_ED_FT
1. Return to ED for worsening, progressive or any other concerning symptoms   2. Follow up with your primary care doctor in 2-3days   3. Take 40mg of prednisone once a day for 4 days   4. Use inhaler or nebulize revery 4-6 hours for cough/shortness of breathe     Asthma    Asthma is a condition in which the airways tighten and narrow, making it difficult to breath. Asthma episodes, also called asthma attacks, range from minor to life-threatening. Symptoms include wheezing, coughing, chest tightness, or shortness of breath. The diagnosis of asthma is made by a review of your medical history and a physical exam, but may involve additional testing. Asthma cannot be cured, but medicines and lifestyle changes can help control it. Avoid triggers of asthma which may include animal dander, pollen, mold, smoke, air pollutants, etc.     SEEK IMMEDIATE MEDICAL CARE IF YOU HAVE ANY OF THE FOLLOWING SYMPTOMS: worsening of symptoms, shortness of breath at rest, chest pain, bluish discoloration to lips or fingertips, lightheadedness/dizziness, or fever.

## 2022-02-09 NOTE — ED PROVIDER NOTE - SKIN, MLM
H&P reviewed. The patient was examined and there are no changes to the H&P.         Skin normal color for race, warm, dry and intact. No evidence of rash.

## 2022-09-26 ENCOUNTER — NON-APPOINTMENT (OUTPATIENT)
Age: 27
End: 2022-09-26

## 2022-12-12 NOTE — ED CDU PROVIDER DISPOSITION NOTE - CARE PROVIDER_API CALL
Yonatan Hanson (DO)  Critical Care Medicine; Internal Medicine; Pulmonary Disease  [address] [place] [address]  Phone: [phone]  Fax: [phone]  Follow Up Time:   
eye

## 2023-01-24 NOTE — DISCHARGE NOTE ADULT - NS AS DC STROKE DX YN
[FreeTextEntry1] : 35 y.o. Male with no significant PMHx referred by PCP Dr. Lombardi for evaluation of Low Testosterone. Patient c/o low energy, body aches and cramps over the last several months and gaining 30Lb over the last 1 year. He reports being in stress after his brother past away (?from vaccine) about 1 year ago. Patient reports normal erection and sexual drive. He has 2 biologic children. Denies taking any antidepressants. Denies Hx of mumps, infections or genetic disorders. Denies head trauma, headaches or visual changes. No Hx of opioids or alcohol use. Patient exercise regularly at the gym. \par \par # Low testosterone measured afternoon on 2 occasions (labs provided by PCP. \par TT 97, FT 21, Normal SHBG13\par Low LH and FSH suggestive of hypogonadal hypogonadism\par Recent AM Labs were done with missing Testosterone\par Patient provides partial results as follow:\par LH<0.3\par FSH<0.3\par AM Cortisol 9.5 with ACTH 31\par Prolactin 17.8\par GH 0.6 - normal\par Estrogen 191 - normal\par \par Will obtain hormonal blood work done at 8 AM\par If secondary hypogonadism confirms will consider pituitary MRI\par Patient will be called with results and plan for further management. 
no

## 2023-01-29 ENCOUNTER — EMERGENCY (EMERGENCY)
Facility: HOSPITAL | Age: 28
LOS: 1 days | Discharge: HOME | End: 2023-01-29
Attending: EMERGENCY MEDICINE
Payer: MEDICAID

## 2023-01-29 ENCOUNTER — TRANSCRIPTION ENCOUNTER (OUTPATIENT)
Age: 28
End: 2023-01-29

## 2023-01-29 VITALS
DIASTOLIC BLOOD PRESSURE: 70 MMHG | TEMPERATURE: 98 F | RESPIRATION RATE: 32 BRPM | WEIGHT: 164.91 LBS | SYSTOLIC BLOOD PRESSURE: 133 MMHG | HEIGHT: 74 IN | HEART RATE: 114 BPM | OXYGEN SATURATION: 94 %

## 2023-01-29 VITALS — OXYGEN SATURATION: 98 % | HEART RATE: 82 BPM | RESPIRATION RATE: 18 BRPM

## 2023-01-29 DIAGNOSIS — Z90.89 ACQUIRED ABSENCE OF OTHER ORGANS: Chronic | ICD-10-CM

## 2023-01-29 DIAGNOSIS — Z98.890 OTHER SPECIFIED POSTPROCEDURAL STATES: Chronic | ICD-10-CM

## 2023-01-29 PROCEDURE — 94640 AIRWAY INHALATION TREATMENT: CPT

## 2023-01-29 PROCEDURE — 99284 EMERGENCY DEPT VISIT MOD MDM: CPT

## 2023-01-29 PROCEDURE — 99283 EMERGENCY DEPT VISIT LOW MDM: CPT | Mod: 25

## 2023-01-29 RX ORDER — ACETAMINOPHEN 500 MG
650 TABLET ORAL ONCE
Refills: 0 | Status: COMPLETED | OUTPATIENT
Start: 2023-01-29 | End: 2023-01-29

## 2023-01-29 RX ORDER — IPRATROPIUM/ALBUTEROL SULFATE 18-103MCG
3 AEROSOL WITH ADAPTER (GRAM) INHALATION
Refills: 0 | Status: COMPLETED | OUTPATIENT
Start: 2023-01-29 | End: 2023-01-29

## 2023-01-29 RX ORDER — ALBUTEROL 90 UG/1
2 AEROSOL, METERED ORAL EVERY 6 HOURS
Refills: 0 | Status: DISCONTINUED | OUTPATIENT
Start: 2023-01-29 | End: 2023-02-05

## 2023-01-29 RX ADMIN — Medication 3 MILLILITER(S): at 04:54

## 2023-01-29 RX ADMIN — Medication 3 MILLILITER(S): at 04:53

## 2023-01-29 RX ADMIN — Medication 60 MILLIGRAM(S): at 04:53

## 2023-01-29 RX ADMIN — ALBUTEROL 2 PUFF(S): 90 AEROSOL, METERED ORAL at 06:49

## 2023-01-29 RX ADMIN — Medication 650 MILLIGRAM(S): at 06:48

## 2023-01-29 RX ADMIN — Medication 3 MILLILITER(S): at 04:55

## 2023-01-29 NOTE — ED ADULT NURSE NOTE - CHIEF COMPLAINT QUOTE
c/o of shortness of breath, chest tightness, wheezing started yesterday but is getting worst.  Used nebulizer at home but no relief. hx of asthma

## 2023-01-29 NOTE — ED PROVIDER NOTE - CARE PROVIDER_API CALL
Darren Campbell)  Internal Medicine; Pulmonary Disease  [address] [place] [address]  Phone: [phone]  Fax: [phone]  Follow Up Time:

## 2023-01-29 NOTE — ED PROVIDER NOTE - NSFOLLOWUPINSTRUCTIONS_ED_ALL_ED_FT
- Follow up with pulm  - Follow up with primary care provider   - Take medication as directed    Asthma    Asthma is a condition in which the airways tighten and narrow, making it difficult to breath. Asthma episodes, also called asthma attacks, range from minor to life-threatening. Symptoms include wheezing, coughing, chest tightness, or shortness of breath. The diagnosis of asthma is made by a review of your medical history and a physical exam, but may involve additional testing. Asthma cannot be cured, but medicines and lifestyle changes can help control it. Avoid triggers of asthma which may include animal dander, pollen, mold, smoke, air pollutants, etc.     SEEK IMMEDIATE MEDICAL CARE IF YOU HAVE ANY OF THE FOLLOWING SYMPTOMS: worsening of symptoms, shortness of breath at rest, chest pain, bluish discoloration to lips or fingertips, lightheadedness/dizziness, or fever.

## 2023-01-29 NOTE — ED PROVIDER NOTE - PHYSICAL EXAMINATION
Gen: No acute distress, non toxic  HEENT: Mucous membranes moist, pink conjunctivae, EOMI  CV: RRR, nl s1/s2.  Resp: +wheezing bilaterally. tri-poding. normal rate  GI: Abdomen soft, NT, ND. No rebound, no guarding  : No CVAT  Neuro: A&O x 3, moving all 4 extremities  MSK: No spine or joint tenderness to palpation  Skin: No rashes. intact and perfused.

## 2023-01-29 NOTE — ED PROVIDER NOTE - CLINICAL SUMMARY MEDICAL DECISION MAKING FREE TEXT BOX
27y male PMHx asthma (intubated at age 5) presents for SOB, wheezing. pt reports symptoms started yesterday with wheezing, chest feeling tight. Pt reports taking nebulizer treatment albuterol twice over 8 hours without relief. pt states he currently does not have inhaler. pt report chest feeling tight, difficulty taking deep breath. Pt denies fever, chills, sore throat, congestion, productive cough, chest pain.  +wheezing bialterally, tri-poding during exam.   Duo-neb, prednisone, re-assess 27y male PMHx asthma (intubated at age 5) presents for SOB, wheezing. pt reports symptoms started yesterday with wheezing, chest feeling tight. Pt reports taking nebulizer treatment albuterol twice over 8 hours without relief. pt states he currently does not have inhaler. pt report chest feeling tight, difficulty taking deep breath. Pt denies fever, chills, sore throat, congestion, productive cough, chest pain.  +wheezing bialterally, tri-poding during exam.   Duo-neb, prednisone, re-assess  0644: Pt reassessed, pt feeling better at this time, vss, pt able to walk, talk and vocalized plan of action. Discussed in depth and explained to pt in depth the next steps that need to be taken including proper follow up with PCP or specialists. All incidental findings were discussed with pt as well. Pt verbalized their concerns and all questions were answered. Pt understands dispo and wants discharge. Given good instructions when to return to ED, strict return precautions and importance of f/u.

## 2023-01-29 NOTE — ED ADULT TRIAGE NOTE - CHIEF COMPLAINT QUOTE
c/o of shortness of breath, chest tightness, wheezing.  used nebulizer at home but no relief. c/o of shortness of breath, chest tightness, wheezing started yesterday but is getting worst.  Used nebulizer at home but no relief. hx of asthma

## 2023-01-29 NOTE — ED PROVIDER NOTE - OBJECTIVE STATEMENT
27y male PMHx asthma (intubated at age 5) presents for SOB, wheezing. pt reports symptoms started yesterday with wheezing, chest feeling tight. Pt reports taking nebulizer treatment albuterol twice over 8 hours without relief. pt states he currently does not have inhaler. pt report chest feeling tight, difficulty taking deep breath. Pt denies fever, chills, sore throat, congestion, productive cough, chest pain.

## 2023-01-29 NOTE — ED ADULT NURSE NOTE - OBJECTIVE STATEMENT
Pt presents to ED for asthma exacerbation. Pt c/o SOB, chest tightness and wheezing that is unrelieved by home nebulizer treatments. Denies chest pain, fever, chills, or sick contacts. Pt medicated as per MD orders. Educated on plan of care and expresses understanding.

## 2023-01-29 NOTE — ED PROVIDER NOTE - PATIENT PORTAL LINK FT
You can access the FollowMyHealth Patient Portal offered by [facility] by registering at the following website: [email or web address]. By joining [employer]’s FollowMyHealth portal, you will also be able to view your health information using other applications (apps) compatible with our system.

## 2023-01-30 ENCOUNTER — TRANSCRIPTION ENCOUNTER (OUTPATIENT)
Age: 28
End: 2023-01-30

## 2023-04-22 NOTE — ED ADULT NURSE NOTE - CADM POA PRESS ULCER
Problem: Potential for Falls  Goal: Patient will remain free of falls  Description: INTERVENTIONS:  - Educate patient/family on patient safety including physical limitations  - Instruct patient to call for assistance with activity   - Consult OT/PT to assist with strengthening/mobility   - Keep Call bell within reach  - Keep bed low and locked with side rails adjusted as appropriate  - Keep care items and personal belongings within reach  - Initiate and maintain comfort rounds  - Make Fall Risk Sign visible to staff  - Apply yellow socks and bracelet for high fall risk patients  - Consider moving patient to room near nurses station  Outcome: Progressing     Problem: Nutrition/Hydration-ADULT  Goal: Nutrient/Hydration intake appropriate for improving, restoring or maintaining nutritional needs  Description: Monitor and assess patient's nutrition/hydration status for malnutrition  Collaborate with interdisciplinary team and initiate plan and interventions as ordered  Monitor patient's weight and dietary intake as ordered or per policy  Utilize nutrition screening tool and intervene as necessary  Determine patient's food preferences and provide high-protein, high-caloric foods as appropriate       INTERVENTIONS:  - Monitor oral intake, urinary output, labs, and treatment plans  - Assess nutrition and hydration status and recommend course of action  - Evaluate amount of meals eaten  - Assist patient with eating if necessary   - Allow adequate time for meals  - Recommend/ encourage appropriate diets, oral nutritional supplements, and vitamin/mineral supplements  - Order, calculate, and assess calorie counts as needed  - Recommend, monitor, and adjust tube feedings and TPN/PPN based on assessed needs  - Assess need for intravenous fluids  - Provide specific nutrition/hydration education as appropriate  - Include patient/family/caregiver in decisions related to nutrition  Outcome: Progressing     Problem: SAFETY ADULT  Goal: Patient will remain free of falls  Description: INTERVENTIONS:  - Educate patient/family on patient safety including physical limitations  - Instruct patient to call for assistance with activity   - Consult OT/PT to assist with strengthening/mobility   - Keep Call bell within reach  - Keep bed low and locked with side rails adjusted as appropriate  - Keep care items and personal belongings within reach  - Initiate and maintain comfort rounds  - Make Fall Risk Sign visible to staff  - Offer Toileting every 2 Hours, in advance of need  - Initiate/Maintain bed alarm  - Obtain necessary fall risk management equipment  - Apply yellow socks and bracelet for high fall risk patients  - Consider moving patient to room near nurses station  Outcome: Progressing  Goal: Maintain or return to baseline ADL function  Description: INTERVENTIONS:  -  Assess patient's ability to carry out ADLs; assess patient's baseline for ADL function and identify physical deficits which impact ability to perform ADLs (bathing, care of mouth/teeth, toileting, grooming, dressing, etc )  - Assess/evaluate cause of self-care deficits   - Assess range of motion  - Assess patient's mobility; develop plan if impaired  - Assess patient's need for assistive devices and provide as appropriate  - Encourage maximum independence but intervene and supervise when necessary  - Involve family in performance of ADLs  - Assess for home care needs following discharge   - Consider OT consult to assist with ADL evaluation and planning for discharge  - Provide patient education as appropriate  Outcome: Progressing  Goal: Maintains/Returns to pre admission functional level  Description: INTERVENTIONS:  - Perform BMAT or MOVE assessment daily    - Set and communicate daily mobility goal to care team and patient/family/caregiver     - Collaborate with rehabilitation services on mobility goals if consulted  - Stand patient 3 times a day  - Ambulate patient 3 times a day  - Out of bed to chair 3 times a day   - Out of bed for meals 3 times a day  - Out of bed for toileting  - Record patient progress and toleration of activity level   Outcome: Progressing     Problem: DISCHARGE PLANNING  Goal: Discharge to home or other facility with appropriate resources  Description: INTERVENTIONS:  - Identify barriers to discharge w/patient and caregiver  - Arrange for needed discharge resources and transportation as appropriate  - Identify discharge learning needs (meds, wound care, etc )  - Arrange for interpretive services to assist at discharge as needed  - Refer to Case Management Department for coordinating discharge planning if the patient needs post-hospital services based on physician/advanced practitioner order or complex needs related to functional status, cognitive ability, or social support system  Outcome: Progressing     Problem: Knowledge Deficit  Goal: Patient/family/caregiver demonstrates understanding of disease process, treatment plan, medications, and discharge instructions  Description: Complete learning assessment and assess knowledge base    Interventions:  - Provide teaching at level of understanding  - Provide teaching via preferred learning methods  Outcome: Progressing     Problem: CARDIOVASCULAR - ADULT  Goal: Maintains optimal cardiac output and hemodynamic stability  Description: INTERVENTIONS:  - Monitor I/O, vital signs and rhythm  - Monitor for S/S and trends of decreased cardiac output  - Administer and titrate ordered vasoactive medications to optimize hemodynamic stability  - Assess quality of pulses, skin color and temperature  - Assess for signs of decreased coronary artery perfusion  - Instruct patient to report change in severity of symptoms  Outcome: Progressing  Goal: Absence of cardiac dysrhythmias or at baseline rhythm  Description: INTERVENTIONS:  - Continuous cardiac monitoring, vital signs, obtain 12 lead EKG if ordered  - Administer antiarrhythmic and heart rate control medications as ordered  - Monitor electrolytes and administer replacement therapy as ordered  Outcome: Progressing     Problem: RESPIRATORY - ADULT  Goal: Achieves optimal ventilation and oxygenation  Description: INTERVENTIONS:  - Assess for changes in respiratory status  - Assess for changes in mentation and behavior  - Position to facilitate oxygenation and minimize respiratory effort  - Oxygen administered by appropriate delivery if ordered  - Initiate smoking cessation education as indicated  - Encourage broncho-pulmonary hygiene including cough, deep breathe, Incentive Spirometry  - Assess the need for suctioning and aspirate as needed  - Assess and instruct to report SOB or any respiratory difficulty  - Respiratory Therapy support as indicated  Outcome: Progressing     Problem: METABOLIC, FLUID AND ELECTROLYTES - ADULT  Goal: Electrolytes maintained within normal limits  Description: INTERVENTIONS:  - Monitor labs and assess patient for signs and symptoms of electrolyte imbalances  - Administer electrolyte replacement as ordered  - Monitor response to electrolyte replacements, including repeat lab results as appropriate  - Instruct patient on fluid and nutrition as appropriate  Outcome: Progressing  Goal: Fluid balance maintained  Description: INTERVENTIONS:  - Monitor labs   - Monitor I/O and WT  - Instruct patient on fluid and nutrition as appropriate  - Assess for signs & symptoms of volume excess or deficit  Outcome: Progressing     Problem: SKIN/TISSUE INTEGRITY - ADULT  Goal: Incision(s), wounds(s) or drain site(s) healing without S/S of infection  Description: INTERVENTIONS  - Assess and document dressing, incision, wound bed, drain sites and surrounding tissue  - Provide patient and family education  - Perform skin care/dressing changes every day  Outcome: Progressing No

## 2023-05-02 NOTE — ED ADULT TRIAGE NOTE - PRO INTERPRETER NEED 2
"Name: Main Antoine      : 1994      MRN: [id number]  Encounter Provider: Jose C Rodriguez PA-C  Encounter Date: 2023   Encounter department: [address]     1  Moderate episode of recurrent major depressive disorder [facility])  Assessment & Plan: Will decrease Lexapro from 20 mg to 10 mg  We will see if this improves dizziness and \"zaps\"  Discussed referral to psychiatry  Patient is agreeable  I gave her a list of local psychiatrists and recommended she check with her insurance  No suicidal or homicidal thoughts  Orders:  -     CBC and differential; Future  -     Comprehensive metabolic panel; Future  -     TSH, 3rd generation with Free T4 reflex; Future  -     escitalopram (Lexapro) 10 mg tablet; Take 1 tablet (10 mg total) by mouth daily    2  Generalized anxiety disorder  Assessment & Plan: Will decrease Lexapro from 20 mg to 10 mg  We will see if this improves dizziness and \"zaps\"  Discussed referral to psychiatry  Patient is agreeable  I gave her a list of local psychiatrists and recommended she check with her insurance  No suicidal or homicidal thoughts  Orders:  -     CBC and differential; Future  -     Comprehensive metabolic panel; Future  -     TSH, 3rd generation with Free T4 reflex; Future  -     escitalopram (Lexapro) 10 mg tablet; Take 1 tablet (10 mg total) by mouth daily    3  Hidradenitis suppurativa  Assessment & Plan:  Script for Clindamycin  Referral to dermatology  Recommended that she keep area clean and dry  She will notify us if symptoms do not improve or worsen    Orders:  -     Ambulatory Referral to Dermatology; Future  -     clindamycin (CLEOCIN) 300 MG capsule; Take 1 capsule (300 mg total) by mouth 3 (three) times a day for 7 days  -     CBC and differential; Future    4  History of gestational diabetes  -     Comprehensive metabolic panel; Future  -     HEMOGLOBIN A1C W/ EAG ESTIMATION; Future    5   Screening for " "hyperlipidemia  -     Lipid panel; Future    6  Dizziness  Assessment & Plan:  Labs ordered to further evaluate  Decreased dose of Lexapro back to 10 mg daily  She will notify us if symptoms do not improve or worsen             Subjective      Murl Room is a very pleasant 33year old female who is here today for a follow-up for anxiety and depression  She does not feel the increase in her Lexapro helped  She does not feel as well as she did when she was on this regimen prior to getting pregnant with her youngest child  She denies any suicidal or homicidal thoughts  She is interested in seeing a psychiatrist  Since increasing her dose, she has been getting \"zaps\" throughout her body  She also admits to occasional episodes of dizziness  She has not noticed any particular triggers  She did have gestational diabetes in her pregnancy, which is a concern  Lastly, she has a history of hidradenitis supprativa  She has a lesion on her right breast that drains and is not healing  She deneis any fevers or chills  Review of Systems   Constitutional: Negative for chills, diaphoresis, fatigue and fever  HENT: Negative for congestion, ear pain, postnasal drip, rhinorrhea, sneezing, sore throat and trouble swallowing  Eyes: Negative for pain and visual disturbance  Respiratory: Negative for apnea, cough, shortness of breath and wheezing  Cardiovascular: Negative for chest pain and palpitations  Gastrointestinal: Negative for abdominal pain, constipation, diarrhea, nausea and vomiting  Genitourinary: Negative for dysuria and frequency  Musculoskeletal: Negative for arthralgias, gait problem and myalgias  Neurological: Positive for dizziness  Negative for syncope, weakness, light-headedness, numbness and headaches  Psychiatric/Behavioral: Positive for dysphoric mood  Negative for suicidal ideas  The patient is nervous/anxious          Current Outpatient Medications on File Prior to Visit   Medication Sig    " buPROPion (Wellbutrin SR) 200 MG 12 hr tablet Take 1 tablet (200 mg total) by mouth 2 (two) times a day    [DISCONTINUED] escitalopram (Lexapro) 20 mg tablet Take 1 tablet (20 mg total) by mouth daily       Objective     /82   Pulse (!) 112   Ht 6' (1 829 m)   Wt (!) 149 kg (328 lb 12 8 oz)   SpO2 97%   BMI 44 59 kg/m²     Physical Exam  Vitals and nursing note reviewed  Constitutional:       Appearance: She is well-developed  HENT:      Head: Normocephalic and atraumatic  Right Ear: Tympanic membrane, ear canal and external ear normal       Left Ear: Tympanic membrane, ear canal and external ear normal       Nose: Nose normal       Mouth/Throat:      Pharynx: No oropharyngeal exudate or posterior oropharyngeal erythema  Eyes:      Pupils: Pupils are equal, round, and reactive to light  Cardiovascular:      Rate and Rhythm: Normal rate and regular rhythm  Heart sounds: Normal heart sounds  No murmur heard  No friction rub  No gallop  Pulmonary:      Effort: Pulmonary effort is normal  No respiratory distress  Breath sounds: Normal breath sounds  No wheezing or rales  Musculoskeletal:         General: Normal range of motion  Cervical back: Normal range of motion and neck supple  Lymphadenopathy:      Cervical: No cervical adenopathy  Skin:     General: Skin is warm and dry  Comments: Superficial abscess on right breast consistent with HS  No active drainage  Mild erythema  Mild tenderness to palpation  Neurological:      Mental Status: She is alert and oriented to person, place, and time  Psychiatric:         Mood and Affect: Mood is anxious and depressed  Behavior: Behavior normal          Thought Content: Thought content normal  Thought content does not include homicidal or suicidal ideation  Thought content does not include homicidal or suicidal plan           Judgment: Judgment normal        Paul Callejas PA-C English

## 2023-05-29 ENCOUNTER — EMERGENCY (EMERGENCY)
Facility: HOSPITAL | Age: 28
LOS: 1 days | Discharge: HOME | End: 2023-05-29
Attending: EMERGENCY MEDICINE
Payer: MEDICAID

## 2023-05-29 VITALS
HEART RATE: 113 BPM | WEIGHT: 158.07 LBS | RESPIRATION RATE: 30 BRPM | OXYGEN SATURATION: 96 % | SYSTOLIC BLOOD PRESSURE: 142 MMHG | HEIGHT: 72 IN | TEMPERATURE: 98 F | DIASTOLIC BLOOD PRESSURE: 86 MMHG

## 2023-05-29 DIAGNOSIS — Z98.890 OTHER SPECIFIED POSTPROCEDURAL STATES: Chronic | ICD-10-CM

## 2023-05-29 DIAGNOSIS — Z90.89 ACQUIRED ABSENCE OF OTHER ORGANS: Chronic | ICD-10-CM

## 2023-05-29 PROCEDURE — 96375 TX/PRO/DX INJ NEW DRUG ADDON: CPT

## 2023-05-29 PROCEDURE — 99284 EMERGENCY DEPT VISIT MOD MDM: CPT | Mod: 25

## 2023-05-29 PROCEDURE — 71046 X-RAY EXAM CHEST 2 VIEWS: CPT | Mod: 26

## 2023-05-29 PROCEDURE — 94640 AIRWAY INHALATION TREATMENT: CPT

## 2023-05-29 PROCEDURE — 99284 EMERGENCY DEPT VISIT MOD MDM: CPT

## 2023-05-29 PROCEDURE — 96374 THER/PROPH/DIAG INJ IV PUSH: CPT

## 2023-05-29 PROCEDURE — 71046 X-RAY EXAM CHEST 2 VIEWS: CPT

## 2023-05-29 RX ORDER — ALBUTEROL 90 UG/1
2 AEROSOL, METERED ORAL EVERY 4 HOURS
Refills: 0 | Status: DISCONTINUED | OUTPATIENT
Start: 2023-05-29 | End: 2023-06-06

## 2023-05-29 RX ORDER — MAGNESIUM SULFATE 500 MG/ML
2 VIAL (ML) INJECTION ONCE
Refills: 0 | Status: COMPLETED | OUTPATIENT
Start: 2023-05-29 | End: 2023-05-29

## 2023-05-29 RX ORDER — IPRATROPIUM/ALBUTEROL SULFATE 18-103MCG
3 AEROSOL WITH ADAPTER (GRAM) INHALATION ONCE
Refills: 0 | Status: COMPLETED | OUTPATIENT
Start: 2023-05-29 | End: 2023-05-29

## 2023-05-29 RX ORDER — ALBUTEROL 90 UG/1
AEROSOL, METERED ORAL
Refills: 0 | Status: DISCONTINUED | OUTPATIENT
Start: 2023-05-29 | End: 2023-05-29

## 2023-05-29 RX ORDER — IPRATROPIUM BROMIDE 0.2 MG/ML
500 SOLUTION, NON-ORAL INHALATION EVERY 6 HOURS
Refills: 0 | Status: DISCONTINUED | OUTPATIENT
Start: 2023-05-29 | End: 2023-05-29

## 2023-05-29 RX ADMIN — ALBUTEROL 2 PUFF(S): 90 AEROSOL, METERED ORAL at 23:00

## 2023-05-29 RX ADMIN — Medication 3 MILLILITER(S): at 21:59

## 2023-05-29 RX ADMIN — Medication 125 MILLIGRAM(S): at 21:41

## 2023-05-29 RX ADMIN — Medication 3 MILLILITER(S): at 21:21

## 2023-05-29 RX ADMIN — Medication 150 GRAM(S): at 21:21

## 2023-05-29 NOTE — ED PROVIDER NOTE - CLINICAL SUMMARY MEDICAL DECISION MAKING FREE TEXT BOX
28yo M p/w asthma exacerbation. on initial eval, non-toxic appearing male found sitting in stretcher in tripod position, obvious SOB, skin warm and perfused, diffuse wheezing b/l noted, remainder of PE WNL. VSS. CXR neg for PNA, effusion, pneumothorax, lung hyperinflation. reviewed result with pt. ordered duel neb, steroid and mag. 28yo M p/w asthma exacerbation. on initial eval, non-toxic appearing male found sitting in stretcher in tripod position, obvious SOB, skin warm and perfused, diffuse wheezing b/l noted, remainder of PE WNL. VSS. CXR neg for PNA, effusion, pneumothorax, lung hyperinflation. reviewed result with pt. ordered duel neb, steroid and mag- wheezing and sx improved after meds. provided pt with inhaler. rx steroids. discussed supportive care measures and return precautions. advised to f.u with PCP. pt verbalized understanding and agreement

## 2023-05-29 NOTE — ED PROVIDER NOTE - NSFOLLOWUPINSTRUCTIONS_ED_ALL_ED_FT
- Please bring all documentation from your ED visit to any related future follow up appointment.  - Please call to schedule follow up appointment with your primary care physician within 24-48 hours.  - Please seek immediate medical attention or return to the ED for any new/worsening, signs/symptoms, or concerns     Asthma    Asthma is a condition in which the airways tighten and narrow, making it difficult to breath. Asthma episodes, also called asthma attacks, range from minor to life-threatening. Symptoms include wheezing, coughing, chest tightness, or shortness of breath. The diagnosis of asthma is made by a review of your medical history and a physical exam, but may involve additional testing. Asthma cannot be cured, but medicines and lifestyle changes can help control it. Avoid triggers of asthma which may include animal dander, pollen, mold, smoke, air pollutants, etc.     SEEK IMMEDIATE MEDICAL CARE IF YOU HAVE ANY OF THE FOLLOWING SYMPTOMS: worsening of symptoms, shortness of breath at rest, chest pain, bluish discoloration to lips or fingertips, lightheadedness/dizziness, or fever.

## 2023-05-29 NOTE — ED PROVIDER NOTE - NS ED ATTENDING STATEMENT MOD
This was a shared visit with the SKYLER. I reviewed and verified the documentation and independently performed the documented:

## 2023-05-29 NOTE — ED PROVIDER NOTE - PHYSICAL EXAMINATION
General: non-toxic appearing, A+Ox3, SOB, pausing for breath after 3 words   HENT: normocephalic. EAC without erythema, TMs translucent, Nasal mucosa non-inflamed, septum and turbinates normal. Mucous membranes moist, no gingival inflammation, no tonsillar hypertrophy or exudates, uvula midline. Neck supple without bruits or adenopathy   CV: mild tachycardia, nl s1/s2.  Resp: diffuse wheezing b/l, long expiratory phase, tripod position  Skin: warm and perfused.

## 2023-05-29 NOTE — ED PROVIDER NOTE - PATIENT PORTAL LINK FT
You can access the FollowMyHealth Patient Portal offered by [facility] by registering at the following website: [email or web address]. By joining [employer]’s FollowMyHealth portal, you will also be able to view your health information using other applications (apps) compatible with our system.

## 2023-05-29 NOTE — ED ADULT NURSE NOTE - CHIEF COMPLAINT QUOTE
c/o of SOB, chest tightness. Pt with asthma used nebulizer and inhaler but no relief. Report increase work of breathing 0

## 2023-05-29 NOTE — ED ADULT NURSE NOTE - NSFALLUNIVINTERV_ED_ALL_ED
Bed/Stretcher in lowest position, wheels locked, appropriate side rails in place/Call bell, personal items and telephone in reach/Instruct patient to call for assistance before getting out of bed/chair/stretcher/Non-slip footwear applied when patient is off stretcher/[place] to call system/Physically safe environment - no spills, clutter or unnecessary equipment/Purposeful proactive rounding/Room/bathroom lighting operational, light cord in reach
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
## 2023-05-29 NOTE — ED ADULT TRIAGE NOTE - CHIEF COMPLAINT QUOTE
c/o of SOB, chest tightness. Pt with asthma used nebulizer and inhaler but no relief. Report increase work of breathing

## 2023-05-29 NOTE — ED ADULT NURSE NOTE - OBJECTIVE STATEMENT
pt came in complaining of an asthma attack pt states he has asthma and uses an inhaler but ran out. pt also reports previously being hospitalized as well as being intubated in the past. upon assessment pt is breathing labored.

## 2023-07-17 NOTE — ED PROVIDER NOTE - PRINCIPAL DIAGNOSIS
[FreeTextEntry1] : 1.  Chest discomfort: Atypical; EKG reviewed normal sinus rhythm within normal limits.  We will need to rule out ischemic heart disease in this young male with new onset chest discomfort.  Will advise once results of an ETT are known.\par 2.  Shortness of breath: Echocardiogram [EKG obtained to assist in diagnosis and management of assessed problem(s)] : EKG obtained to assist in diagnosis and management of assessed problem(s) Asthma

## 2023-11-08 ENCOUNTER — APPOINTMENT (OUTPATIENT)
Dept: PULMONOLOGY | Facility: CLINIC | Age: 28
End: 2023-11-08

## 2024-01-01 ENCOUNTER — EMERGENCY (EMERGENCY)
Facility: HOSPITAL | Age: 29
LOS: 1 days | End: 2024-01-01
Attending: STUDENT IN AN ORGANIZED HEALTH CARE EDUCATION/TRAINING PROGRAM
Payer: MEDICAID

## 2024-01-01 VITALS — HEIGHT: 70 IN | WEIGHT: 179.9 LBS

## 2024-01-01 DIAGNOSIS — Z90.89 ACQUIRED ABSENCE OF OTHER ORGANS: Chronic | ICD-10-CM

## 2024-01-01 DIAGNOSIS — Z98.890 OTHER SPECIFIED POSTPROCEDURAL STATES: Chronic | ICD-10-CM

## 2024-01-01 PROCEDURE — 92950 HEART/LUNG RESUSCITATION CPR: CPT

## 2024-01-01 PROCEDURE — 99285 EMERGENCY DEPT VISIT HI MDM: CPT | Mod: 25

## 2024-01-01 PROCEDURE — C1729: CPT

## 2024-01-01 PROCEDURE — 32556 INSERT CATH PLEURA W/O IMAGE: CPT | Mod: LT

## 2024-01-01 PROCEDURE — 32551 INSERTION OF CHEST TUBE: CPT

## 2024-01-01 RX ORDER — KETAMINE HYDROCHLORIDE 10 MG/ML
200 INJECTION INTRAMUSCULAR; INTRAVENOUS ONCE
Refills: 0 | Status: DISCONTINUED | OUTPATIENT
Start: 2024-01-01 | End: 2024-01-01

## 2024-01-01 RX ADMIN — KETAMINE HYDROCHLORIDE 200 MILLIGRAM(S): 10 INJECTION INTRAMUSCULAR; INTRAVENOUS at 14:55

## 2024-01-25 NOTE — ED ADULT NURSE NOTE - DOES PATIENT HAVE ADVANCE DIRECTIVE
----- Message from Bel Andrade MD sent at 1/25/2024  3:07 PM CST -----  +flu A, supportive tx  
Called pt and informed him of positive flu A. Discussed provider recommendations. All questions answered.   
Yes

## 2024-02-01 NOTE — ED PROVIDER NOTE - RECENT EXPOSURE TO
Corneal Abrasion  If your condition worsens or fails to improve we recommend that you receive another evaluation at the ER immediately or contact your PCP to discuss your concerns or return here. You must understand that you've received an urgent care treatment only and that you may be released before all your medical problems are known or treated. You the patient will arrange for followup care as instructed.     Keep eyes cleaned.    Use the eye drops as prescribed.    Do not wear your contact lens ( if you use them) for at least 5 days after you stop having symptoms and are rechecked by your doctor.     Cool compresses to affected eye three times a day    Throw away the contacts, contact solution and carrying case you were using and start with new material.     If you develop increase eye symptoms or change in your vision seek medical care immediately either with your ophthalomologist or the ER or return here.     
none known

## 2024-02-11 ENCOUNTER — EMERGENCY (EMERGENCY)
Facility: HOSPITAL | Age: 29
LOS: 1 days | Discharge: HOME | End: 2024-02-11
Attending: EMERGENCY MEDICINE
Payer: MEDICAID

## 2024-02-11 VITALS
HEIGHT: 72 IN | OXYGEN SATURATION: 98 % | HEART RATE: 100 BPM | TEMPERATURE: 98 F | RESPIRATION RATE: 18 BRPM | DIASTOLIC BLOOD PRESSURE: 75 MMHG | WEIGHT: 159.39 LBS | SYSTOLIC BLOOD PRESSURE: 112 MMHG

## 2024-02-11 DIAGNOSIS — Z98.890 OTHER SPECIFIED POSTPROCEDURAL STATES: Chronic | ICD-10-CM

## 2024-02-11 DIAGNOSIS — Z90.89 ACQUIRED ABSENCE OF OTHER ORGANS: Chronic | ICD-10-CM

## 2024-02-11 PROCEDURE — 99284 EMERGENCY DEPT VISIT MOD MDM: CPT

## 2024-02-11 PROCEDURE — 94640 AIRWAY INHALATION TREATMENT: CPT

## 2024-02-11 PROCEDURE — 99284 EMERGENCY DEPT VISIT MOD MDM: CPT | Mod: 25

## 2024-02-11 RX ORDER — ALBUTEROL 90 UG/1
2.5 AEROSOL, METERED ORAL ONCE
Refills: 0 | Status: COMPLETED | OUTPATIENT
Start: 2024-02-11 | End: 2024-02-11

## 2024-02-11 RX ORDER — ALBUTEROL 90 UG/1
1 AEROSOL, METERED ORAL ONCE
Refills: 0 | Status: DISCONTINUED | OUTPATIENT
Start: 2024-02-11 | End: 2024-02-18

## 2024-02-11 RX ORDER — IPRATROPIUM/ALBUTEROL SULFATE 18-103MCG
3 AEROSOL WITH ADAPTER (GRAM) INHALATION ONCE
Refills: 0 | Status: COMPLETED | OUTPATIENT
Start: 2024-02-11 | End: 2024-02-11

## 2024-02-11 RX ORDER — ALBUTEROL 90 UG/1
2 AEROSOL, METERED ORAL
Qty: 1 | Refills: 0
Start: 2024-02-11 | End: 2024-02-17

## 2024-02-11 RX ADMIN — Medication 50 MILLIGRAM(S): at 04:16

## 2024-02-11 RX ADMIN — Medication 3 MILLILITER(S): at 02:17

## 2024-02-11 RX ADMIN — ALBUTEROL 2.5 MILLIGRAM(S): 90 AEROSOL, METERED ORAL at 04:17

## 2024-02-11 NOTE — ED PROVIDER NOTE - PATIENT PORTAL LINK FT
You can access the FollowMyHealth Patient Portal offered by [facility] by registering at the following website: [email or web address]. By joining [email or web address]’s FollowMyHealth portal, you will also be able to view your health information using other applications (apps) compatible with our system.

## 2024-02-11 NOTE — ED PROVIDER NOTE - PHYSICAL EXAMINATION
General: Well appearing in no acute distress, alert and cooperative  Head: Normocephalic, atraumatic  Eyes: PERRLA, no conjunctival injection, no scleral icterus, EOMI  ENMT: Atraumatic external nose and ears  Neck: Soft and supple, full ROM without pain  Cardiac: Regular rate and regular rhythm, no murmurs  Resp: Unlabored respiratory effort, Faint wheeze  Abd: Soft, non-tender, non-distended  MSK: Spine midline and non-tender  Skin: Warm and dry  Neuro: AO x 3, moves all extremities symmetrically, Motor strength and sensation grossly intact

## 2024-02-11 NOTE — ED ADULT NURSE NOTE - NSFALLUNIVINTERV_ED_ALL_ED
Bed/Stretcher in lowest position, wheels locked, appropriate side rails in place/Call bell, personal items and telephone in reach/Instruct patient to call for assistance before getting out of bed/chair/stretcher/Non-slip footwear applied when patient is off stretcher/[place] to call system/Physically safe environment - no spills, clutter or unnecessary equipment/Purposeful proactive rounding/Room/bathroom lighting operational, light cord in reach

## 2024-02-11 NOTE — ED ADULT NURSE NOTE - OBJECTIVE STATEMENT
pt presents to ED in NAD c/o cough and SOB worsening over past 3 days. pt has hx of asthma, states when this happens his rescue inhaler helps as well as prednisone. pt states he ran out of his inhaler, came to ED. pt tachypenic but with no respiratory distress. pt denies chest pain, N/V, fever, chills, lightheadedness, and dizziness. pt breathing even and unlabored at this time.

## 2024-02-11 NOTE — ED ADULT TRIAGE NOTE - CHIEF COMPLAINT QUOTE
Patient presents to ED with c/o trouble breathing times a couple of days.  Patient with h/o asthma.  Patient states he has been taking nebulizer tx's q 4 hours, last tx 20 min PTA.  Per patient, he was intubated at the age of 6.  O2 sat 98-99% in triage.  No acute respiratory distress noted in triage.

## 2024-02-11 NOTE — ED PROVIDER NOTE - PROGRESS NOTE DETAILS
Stable on reassessment.  Improvement of symptoms after treatment.  saturating well on room air in no acute distress. Patient requesting discharge and follow-up outpatient.  Albuterol given and prednisone to pharmacy. -DO Sarah

## 2024-02-11 NOTE — ED PROVIDER NOTE - OBJECTIVE STATEMENT
28-year-old male with history of asthma presenting with worsening cough and shortness of breath over the past 3 days without fever, chest pain, palpitations.  Patient states that he is ran out of the albuterol rescue inhaler.  In no acute respiratory distress on ED arrival.  Denies chills, headache,  nausea, vomiting, diarrhea, hematuria, dysuria, dark stools, focal neurologic symptoms.  Reports intubation as a young child due to severe asthma exacerbation.

## 2024-02-11 NOTE — ED PROVIDER NOTE - CLINICAL SUMMARY MEDICAL DECISION MAKING FREE TEXT BOX
28-year-old male with cough and shortness of breath in the setting of asthma.  No fever, chest pain.  Slight wheeze bilaterally saturating well on room air in no respiratory distress.  Differential includes asthma exacerbation, viral illness.  Improvement of symptoms after treatment remains hemodynamically stable and saturating well on room air throughout ED stay.  Rescue inhaler provided to patient with prednisone to pharmacy.  Patient to follow-up with primary care physician.

## 2024-02-11 NOTE — ED PROVIDER NOTE - NSFOLLOWUPINSTRUCTIONS_ED_ALL_ED_FT
Please follow-up with your primary care doctor.  Please call for an appointment in the next 48 hours but if you cannot follow-up with your primary care doctor please return to the Emergency Department for any urgent issues.    You were given a copy of the tests performed today.  Please bring the results with you and review them with your primary care doctor.    If you have any worsening of symptoms or any other concerns please return to the Emergency Department immediately.    Please continue taking your home medications as directed.    Asthma    Asthma is a condition in which the airways tighten and narrow, making it difficult to breath. Asthma episodes, also called asthma attacks, range from minor to life-threatening. Symptoms include wheezing, coughing, chest tightness, or shortness of breath. The diagnosis of asthma is made by a review of your medical history and a physical exam, but may involve additional testing. Asthma cannot be cured, but medicines and lifestyle changes can help control it. Avoid triggers of asthma which may include animal dander, pollen, mold, smoke, air pollutants, etc.     SEEK IMMEDIATE MEDICAL CARE IF YOU HAVE ANY OF THE FOLLOWING SYMPTOMS: worsening of symptoms, shortness of breath at rest, chest pain, bluish discoloration to lips or fingertips, lightheadedness/dizziness, or fever.

## 2024-02-11 NOTE — ED ADULT NURSE REASSESSMENT NOTE - NS ED NURSE REASSESS COMMENT FT1
pt left without inhaler despite being advised to stay. pt breathing even and unlabored, states improvement in breathing after nebulizer treatment.

## 2024-02-14 NOTE — ED ADULT NURSE REASSESSMENT NOTE - GENITOURINARY WDL
2/9/24, 2:21    Pt left a vm in [place].   [phone]   
Called pt with  #[phone]. Pt was just calling to confirm her OV for tomorrow at 2/15/24.   
Bladder non-tender and non-distended. Urine clear yellow.

## 2024-03-11 ENCOUNTER — EMERGENCY (EMERGENCY)
Facility: HOSPITAL | Age: 29
LOS: 1 days | Discharge: HOME | End: 2024-03-11
Attending: EMERGENCY MEDICINE
Payer: MEDICAID

## 2024-03-11 VITALS
DIASTOLIC BLOOD PRESSURE: 84 MMHG | RESPIRATION RATE: 18 BRPM | OXYGEN SATURATION: 97 % | HEART RATE: 105 BPM | SYSTOLIC BLOOD PRESSURE: 151 MMHG

## 2024-03-11 VITALS
HEIGHT: 72 IN | TEMPERATURE: 98 F | HEART RATE: 134 BPM | RESPIRATION RATE: 20 BRPM | SYSTOLIC BLOOD PRESSURE: 141 MMHG | OXYGEN SATURATION: 92 % | WEIGHT: 160.06 LBS | DIASTOLIC BLOOD PRESSURE: 89 MMHG

## 2024-03-11 DIAGNOSIS — Z90.89 ACQUIRED ABSENCE OF OTHER ORGANS: Chronic | ICD-10-CM

## 2024-03-11 DIAGNOSIS — Z98.890 OTHER SPECIFIED POSTPROCEDURAL STATES: Chronic | ICD-10-CM

## 2024-03-11 LAB
ALBUMIN SERPL ELPH-MCNC: 4.6 G/DL — SIGNIFICANT CHANGE UP (ref 3.3–5.2)
ALP SERPL-CCNC: 104 U/L — SIGNIFICANT CHANGE UP (ref 40–120)
ALT FLD-CCNC: 41 U/L — HIGH
ANION GAP SERPL CALC-SCNC: 17 MMOL/L — SIGNIFICANT CHANGE UP (ref 5–17)
AST SERPL-CCNC: 49 U/L — HIGH
BASE EXCESS BLDV CALC-SCNC: 0.6 MMOL/L — SIGNIFICANT CHANGE UP (ref -2–3)
BASOPHILS # BLD AUTO: 0.05 K/UL — SIGNIFICANT CHANGE UP (ref 0–0.2)
BASOPHILS NFR BLD AUTO: 0.5 % — SIGNIFICANT CHANGE UP (ref 0–2)
BILIRUB SERPL-MCNC: 0.4 MG/DL — SIGNIFICANT CHANGE UP (ref 0.4–2)
BUN SERPL-MCNC: 8.7 MG/DL — SIGNIFICANT CHANGE UP (ref 8–20)
CA-I SERPL-SCNC: 1.16 MMOL/L — SIGNIFICANT CHANGE UP (ref 1.15–1.33)
CALCIUM SERPL-MCNC: 9 MG/DL — SIGNIFICANT CHANGE UP (ref 8.4–10.5)
CHLORIDE BLDV-SCNC: 105 MMOL/L — SIGNIFICANT CHANGE UP (ref 96–108)
CHLORIDE SERPL-SCNC: 103 MMOL/L — SIGNIFICANT CHANGE UP (ref 96–108)
CO2 SERPL-SCNC: 22 MMOL/L — SIGNIFICANT CHANGE UP (ref 22–29)
CREAT SERPL-MCNC: 0.78 MG/DL — SIGNIFICANT CHANGE UP (ref 0.5–1.3)
EGFR: 124 ML/MIN/1.73M2 — SIGNIFICANT CHANGE UP
EOSINOPHIL # BLD AUTO: 0.82 K/UL — HIGH (ref 0–0.5)
EOSINOPHIL NFR BLD AUTO: 8.3 % — HIGH (ref 0–6)
GAS PNL BLDV: 138 MMOL/L — SIGNIFICANT CHANGE UP (ref 136–145)
GAS PNL BLDV: SIGNIFICANT CHANGE UP
GAS PNL BLDV: SIGNIFICANT CHANGE UP
GLUCOSE BLDV-MCNC: 107 MG/DL — HIGH (ref 70–99)
GLUCOSE SERPL-MCNC: 107 MG/DL — HIGH (ref 70–99)
HCO3 BLDV-SCNC: 26 MMOL/L — SIGNIFICANT CHANGE UP (ref 22–29)
HCT VFR BLD CALC: 41.5 % — SIGNIFICANT CHANGE UP (ref 39–50)
HCT VFR BLDA CALC: 46 % — SIGNIFICANT CHANGE UP
HGB BLD CALC-MCNC: 15.4 G/DL — SIGNIFICANT CHANGE UP (ref 12.6–17.4)
HGB BLD-MCNC: 14.7 G/DL — SIGNIFICANT CHANGE UP (ref 13–17)
IMM GRANULOCYTES NFR BLD AUTO: 0.5 % — SIGNIFICANT CHANGE UP (ref 0–0.9)
LACTATE BLDV-MCNC: 2.3 MMOL/L — HIGH (ref 0.5–2)
LYMPHOCYTES # BLD AUTO: 1.89 K/UL — SIGNIFICANT CHANGE UP (ref 1–3.3)
LYMPHOCYTES # BLD AUTO: 19.1 % — SIGNIFICANT CHANGE UP (ref 13–44)
MCHC RBC-ENTMCNC: 29.5 PG — SIGNIFICANT CHANGE UP (ref 27–34)
MCHC RBC-ENTMCNC: 35.4 GM/DL — SIGNIFICANT CHANGE UP (ref 32–36)
MCV RBC AUTO: 83.2 FL — SIGNIFICANT CHANGE UP (ref 80–100)
MONOCYTES # BLD AUTO: 1.09 K/UL — HIGH (ref 0–0.9)
MONOCYTES NFR BLD AUTO: 11 % — SIGNIFICANT CHANGE UP (ref 2–14)
NEUTROPHILS # BLD AUTO: 5.98 K/UL — SIGNIFICANT CHANGE UP (ref 1.8–7.4)
NEUTROPHILS NFR BLD AUTO: 60.6 % — SIGNIFICANT CHANGE UP (ref 43–77)
PCO2 BLDV: 49 MMHG — SIGNIFICANT CHANGE UP (ref 42–55)
PH BLDV: 7.34 — SIGNIFICANT CHANGE UP (ref 7.32–7.43)
PLATELET # BLD AUTO: 243 K/UL — SIGNIFICANT CHANGE UP (ref 150–400)
PO2 BLDV: 46 MMHG — HIGH (ref 25–45)
POTASSIUM BLDV-SCNC: 3.6 MMOL/L — SIGNIFICANT CHANGE UP (ref 3.5–5.1)
POTASSIUM SERPL-MCNC: 3.4 MMOL/L — LOW (ref 3.5–5.3)
POTASSIUM SERPL-SCNC: 3.4 MMOL/L — LOW (ref 3.5–5.3)
PROT SERPL-MCNC: 7.5 G/DL — SIGNIFICANT CHANGE UP (ref 6.6–8.7)
RBC # BLD: 4.99 M/UL — SIGNIFICANT CHANGE UP (ref 4.2–5.8)
RBC # FLD: 12.8 % — SIGNIFICANT CHANGE UP (ref 10.3–14.5)
SAO2 % BLDV: 74.2 % — SIGNIFICANT CHANGE UP
SODIUM SERPL-SCNC: 142 MMOL/L — SIGNIFICANT CHANGE UP (ref 135–145)
WBC # BLD: 9.88 K/UL — SIGNIFICANT CHANGE UP (ref 3.8–10.5)
WBC # FLD AUTO: 9.88 K/UL — SIGNIFICANT CHANGE UP (ref 3.8–10.5)

## 2024-03-11 PROCEDURE — 85014 HEMATOCRIT: CPT

## 2024-03-11 PROCEDURE — 99285 EMERGENCY DEPT VISIT HI MDM: CPT | Mod: 25

## 2024-03-11 PROCEDURE — 36415 COLL VENOUS BLD VENIPUNCTURE: CPT

## 2024-03-11 PROCEDURE — 96374 THER/PROPH/DIAG INJ IV PUSH: CPT

## 2024-03-11 PROCEDURE — 85025 COMPLETE CBC W/AUTO DIFF WBC: CPT

## 2024-03-11 PROCEDURE — 71045 X-RAY EXAM CHEST 1 VIEW: CPT | Mod: 26

## 2024-03-11 PROCEDURE — 83605 ASSAY OF LACTIC ACID: CPT

## 2024-03-11 PROCEDURE — 82435 ASSAY OF BLOOD CHLORIDE: CPT

## 2024-03-11 PROCEDURE — 93010 ELECTROCARDIOGRAM REPORT: CPT

## 2024-03-11 PROCEDURE — 80053 COMPREHEN METABOLIC PANEL: CPT

## 2024-03-11 PROCEDURE — 82330 ASSAY OF CALCIUM: CPT

## 2024-03-11 PROCEDURE — 71045 X-RAY EXAM CHEST 1 VIEW: CPT

## 2024-03-11 PROCEDURE — 99285 EMERGENCY DEPT VISIT HI MDM: CPT

## 2024-03-11 PROCEDURE — 93005 ELECTROCARDIOGRAM TRACING: CPT

## 2024-03-11 PROCEDURE — 94640 AIRWAY INHALATION TREATMENT: CPT

## 2024-03-11 PROCEDURE — 85018 HEMOGLOBIN: CPT

## 2024-03-11 PROCEDURE — 84295 ASSAY OF SERUM SODIUM: CPT

## 2024-03-11 PROCEDURE — 82947 ASSAY GLUCOSE BLOOD QUANT: CPT

## 2024-03-11 PROCEDURE — 82803 BLOOD GASES ANY COMBINATION: CPT

## 2024-03-11 PROCEDURE — 84132 ASSAY OF SERUM POTASSIUM: CPT

## 2024-03-11 RX ORDER — ALBUTEROL 90 UG/1
2 AEROSOL, METERED ORAL
Qty: 1 | Refills: 0
Start: 2024-03-11 | End: 2024-04-09

## 2024-03-11 RX ORDER — ALBUTEROL 90 UG/1
3 AEROSOL, METERED ORAL
Qty: 1 | Refills: 0
Start: 2024-03-11

## 2024-03-11 RX ORDER — IPRATROPIUM/ALBUTEROL SULFATE 18-103MCG
3 AEROSOL WITH ADAPTER (GRAM) INHALATION
Refills: 0 | Status: COMPLETED | OUTPATIENT
Start: 2024-03-11 | End: 2024-03-11

## 2024-03-11 RX ORDER — MAGNESIUM SULFATE 500 MG/ML
2 VIAL (ML) INJECTION ONCE
Refills: 0 | Status: COMPLETED | OUTPATIENT
Start: 2024-03-11 | End: 2024-03-11

## 2024-03-11 RX ADMIN — Medication 3 MILLILITER(S): at 06:45

## 2024-03-11 RX ADMIN — Medication 3 MILLILITER(S): at 06:21

## 2024-03-11 RX ADMIN — Medication 3 MILLILITER(S): at 07:05

## 2024-03-11 RX ADMIN — Medication 150 GRAM(S): at 06:22

## 2024-03-11 RX ADMIN — Medication 60 MILLIGRAM(S): at 06:25

## 2024-03-11 NOTE — ED ADULT NURSE NOTE - OBJECTIVE STATEMENT
assumed pt care at this time pt received from main dept for asthma exact, pt noted tripoding and speaking in short sentences, tachypneic tachycardic and hypertensive RR38 with audible wheezing o2 sat 96% on RA. Pt placed on cardiac monitor. Pt stated asthma exact started two days ago after exposure to dust at work, worsened today around 0200. Pt used rescue inhaler at home without relief. Pt stated has history of asthma with multiple hospitalizations and intubation at approx 4yo.

## 2024-03-11 NOTE — ED PROVIDER NOTE - PATIENT PORTAL LINK FT
You can access the FollowMyHealth Patient Portal offered by [facility] by registering at the following website: [email or web address]. By joining [employer]’s FollowMyHealth portal, you will also be able to view your health information using other applications (apps) compatible with our system.

## 2024-03-11 NOTE — ED PROVIDER NOTE - CLINICAL SUMMARY MEDICAL DECISION MAKING FREE TEXT BOX
HPI: 28-year-old male past medical history of asthma presents complaining of acute asthma exacerbation.  Patient states he was at work and had severe difficulty breathing.  Patient has been having increased use of his albuterol in the last 2 days without relief of his symptoms.  Today unable to speak in full sentences and requiring tripoding while sitting for symptom control.  No recent fevers, chills, known sick contacts, chest pain.  Patient states when he was a child he required intubation for asthma.  No other current complaints at this time.    ROS:   General: No fever, no chills, no malaise, no fatigue  Respiratory: See HPI  Cardiac: no chest pain, no palpitations, no edema, no jvd  Abdomen: No abdominal pain, no nausea, no vomiting, no diarrhea  Musculoskeletal: No myalgia, no arthralgia  Neurologic: No headache, no vertigo, no paresthesia, no focal deficits, no diplopia  Skin: No rash, no evidence of trauma  All other ROS are negative    PE:  General: A&O x3   Head: NC/AT  Eyes: PERRL, EOMI  ENT: Airway patent, mmm  Pulmonary: Diffuse wheezing, poor air entry, tripoding  Cardiac: s1s2, tachycardic to 134 bpm, no M,G,R, No JVD  Back: Normal  spine  Extremities: FROM, symmetric pulses, capillary refill < 2 seconds, no edema, 5/5 strength in b/l UE and LE  Skin: no rash or bruising  Neurologic: alert, speech clear, no focal deficits  Psych: nl mood/affect, nl insight.    MDM: 28-year-old male past medical history of asthma presents complaining of acute asthma exacerbationPatient hypoxic to 92% on room air on arrival.  Given DuoNebs, steroids, IV magnesium.  Obtain CBC, CMP, VBG for metabolic and respiratory evaluation.  Obtain CXR for possible PNA.  Continue with symptomatic control as needed.  Disposition pending results and response to treatments. Pt upgraded to critical care for continuous respiratory monitoring. HPI: 28-year-old male past medical history of asthma presents complaining of acute asthma exacerbation.  Patient states he was at work and had severe difficulty breathing.  Patient has been having increased use of his albuterol in the last 2 days without relief of his symptoms.  Today unable to speak in full sentences and requiring tripoding while sitting for symptom control.  No recent fevers, chills, known sick contacts, chest pain.  Patient states when he was a child he required intubation for asthma.  No other current complaints at this time.    ROS:   General: No fever, no chills, no malaise, no fatigue  Respiratory: See HPI  Cardiac: no chest pain, no palpitations, no edema, no jvd  Abdomen: No abdominal pain, no nausea, no vomiting, no diarrhea  Musculoskeletal: No myalgia, no arthralgia  Neurologic: No headache, no vertigo, no paresthesia, no focal deficits, no diplopia  Skin: No rash, no evidence of trauma  All other ROS are negative    PE:  General: A&O x3   Head: NC/AT  Eyes: PERRL, EOMI  ENT: Airway patent, mmm  Pulmonary: Diffuse wheezing, poor air entry, tripoding  Cardiac: s1s2, tachycardic to 134 bpm, no M,G,R, No JVD  Back: Normal  spine  Extremities: FROM, symmetric pulses, capillary refill < 2 seconds, no edema, 5/5 strength in b/l UE and LE  Skin: no rash or bruising  Neurologic: alert, speech clear, no focal deficits  Psych: nl mood/affect, nl insight.    MDM: 28-year-old male past medical history of asthma presents complaining of acute asthma exacerbationPatient hypoxic to 92% on room air on arrival.  Given DuoNebs, steroids, IV magnesium.  Obtain CBC, CMP, VBG for metabolic and respiratory evaluation.  Obtain CXR for possible PNA.  Continue with symptomatic control as needed.  Disposition pending results and response to treatments. Pt upgraded to critical care for continuous respiratory monitoring.    Independent review of EKG shows sinus tachycardia 114 bpm without STEMI or T WI, , QRS 68, QTc 471.

## 2024-03-11 NOTE — ED PROVIDER NOTE - NSTIMEPROVIDERCAREINITIATE_GEN_ER
Pt's daughter called in requesting a call back from [facility] regarding this matter  11-Mar-2024 06:01

## 2024-03-11 NOTE — ED ADULT NURSE REASSESSMENT NOTE - NS ED NURSE REASSESS COMMENT FT1
Assumed care of pt from critical.     Pt sitting up in bed,  A&Ox4 in NAD @ this time. RR even & unlabored. Pt sinus tach on cardiac monitor. VS as documented. Pt reports improvement in his symptoms s/p medication. Pt denies any pain, complaints, or needs @ this time. Pt to be observed. Safety maintained.
Report received from off going RN, care of pt assumed at 0730. pt received resting on stretcher in mid folwers position. speaking full sentences. bilateral rare scattered wheeze noted on exam. Reports his breathing is much better, pt updated on plan of care, questions asked and answered.

## 2024-03-11 NOTE — ED ADULT TRIAGE NOTE - CHIEF COMPLAINT QUOTE
Ambulatory to ED c/o chest tightness, wheezing, shortness of breath for 2 days.  pt has hx of asthma, reports no relief with albuterol pump and nebulizer, last tx 30 min ago.  pt has been hospitalized for asthma and intubated when he was 5yrs old.  pt tripoding in triage, having difficulty speaking in sentences

## 2024-03-11 NOTE — SBIRT NOTE ADULT - NSSBIRTNALRESKIT_GEN_A_CORE
----- Message from Sarai Hampton sent at 11/10/2020  8:06 AM CST -----  Regarding: Pt [phone]  Would like to get medical advice.  Symptoms (please be specific):  Urine is dark yellow, stress. She was crying the whole time  How long has patient had these symptoms:  1 week  Pharmacy name and phone # [facility] [address] [place] - [address] [phone] (Phone) [phone] (Fax)  Comments: she thinks she has diabetes                     
Patient did not want to go to urgent care or see one of our other providers patient states if the symptoms get worst she will send another message thru the patient portal.  
Please make an appointment with urgent care or Dr Dalal ( or one of her colleagues) so she can be evaluated in an office & perform any necessary testing.     
Offered/Declined

## 2024-03-11 NOTE — SBIRT NOTE ADULT - NSSBIRTALCPASSREFTXDET_GEN_A_CORE
Referral to Treatment attempted. Screening results were   reviewed with the patient and patient was provided information about healthy guidelines and potential negative   consequences associated with level of risk. Motivation and readiness to reduce or stop use was discussed and   goals and activities to make changes were suggested/offered.

## 2024-03-11 NOTE — ED ADULT NURSE NOTE - ED STAT RN HANDOFF DETAILS
Pt verbalizes understanding & denies any questions regarding d/c instructions, follow-up, Rxs. Will return to ED for any new or worsening symptoms.

## 2024-08-20 NOTE — ED ADULT TRIAGE NOTE - SOURCE OF INFORMATION
Attempted to contact pt using both numbers available; no answer, unable to LVM. We need to know what medications he needs refills on.   
Patient

## 2024-10-23 NOTE — ED ADULT TRIAGE NOTE - CHIEF COMPLAINT QUOTE
BIBA  from home unresponsive  -called 911 due  difficulty breathing . As per EMS pt was found in PEA  . Intubated an route by EMS with ETT # 7.0 @ 23 at [facility] . CPR in progress .Pt received 5 rounds of epi  2 gm of Mag 10 mg of Dexa . Pt brought to Trauma for further care . Code teat at the [facility] BIBA  from home unresponsive  -called 911 due  difficulty breathing / collapse . As per EMS pt was found in PEA  . Intubated an route by EMS with ETT # 7.0 @ 23 at [facility] . CPR in progress .Pt received 5 rounds of epi  2 gm of Mag 10 mg of Dexa . Pt brought to Trauma for further care . Code charlette at the [facility]

## 2024-10-23 NOTE — ED PROCEDURE NOTE - PROCEDURE ADDITIONAL DETAILS
Needle decompression performed in setting of cardiac arrest 2/2 status asthmaticus for possible pneumothorax given absent breath sounds on the left.

## 2024-10-23 NOTE — ED PROVIDER NOTE - CLINICAL SUMMARY MEDICAL DECISION MAKING FREE TEXT BOX
Patient is a 28yo M with PMHx of asthma who presents to the ED in cardiac arrest secondary to presumed status asthmaticus

## 2024-10-23 NOTE — ED PROVIDER NOTE - ATTENDING CONTRIBUTION TO CARE
Pt is a 28 yo M brought in from home for cardiac arrest. according to family and EMS patient had a hx of asthma and was having shortness of breath in the morning. Pt called 911 and when EMS arrived he was found at the top of the stairs unresponsive, pulseless in PEA arrest. Pt was intubated in the field. CPR was started immediately.  Pt was given decadron, magnesium, and multiple rounds of epinephrine en route but remained PEA en route.  On arrival, ETT confirmed in appropriate position.  Pt received several more rounds of epi accompanied by CPR.  Pt also received bicarb, albuterol in ETT and ketamine and remained PEA.  There was question of lack of BS on the L lung with apparent JVD and no visible lung sliding on US.  needle decompression was performed with return of blood and air but patient had no clinical improvement. Pt pronounced dead at 1502.  Pt's mother Ngoc notified.  ME notified and will accept case.

## 2024-10-23 NOTE — ED PROVIDER NOTE - PHYSICAL EXAMINATION
Gen: intubated, unresponsive  HEENT: Normocephalic, atraumatic  CV: No pulses  Pulm: Severely diminished breath sounds bilaterally, wheezing  Abdomen: soft, normoactive BS, non distended, nontender, no rebound, no guarding  Musculoskeletal: No gross deformity  Skin: No rashes or lesions. Warm.